# Patient Record
Sex: MALE | Race: WHITE | URBAN - METROPOLITAN AREA
[De-identification: names, ages, dates, MRNs, and addresses within clinical notes are randomized per-mention and may not be internally consistent; named-entity substitution may affect disease eponyms.]

---

## 2019-12-16 ENCOUNTER — TELEPHONE (OUTPATIENT)
Dept: INTERNAL MEDICINE CLINIC | Age: 49
End: 2019-12-16

## 2019-12-16 NOTE — TELEPHONE ENCOUNTER
Dr. Carmencita Rosales: Today   Message Contents   Mulugeta Daughters sent to [facility]  Phone Number: [phone]         Caller's first and last name and relationship to patient (if not the patient): n/a   Best contact number: [phone]   Preferred date and time: sooner than March   Scheduled appointment date and time: 3/6/20 11am   Reason for appointment: New patient establishing primary care provider. Patient stated that he has medical questions for provider, but did not include details during call.    Details to clarify the request: n/a

## 2020-03-10 ENCOUNTER — OFFICE VISIT (OUTPATIENT)
Dept: INTERNAL MEDICINE CLINIC | Age: 50
End: 2020-03-10

## 2020-03-10 VITALS
DIASTOLIC BLOOD PRESSURE: 85 MMHG | HEIGHT: 70 IN | WEIGHT: 187.4 LBS | HEART RATE: 82 BPM | SYSTOLIC BLOOD PRESSURE: 120 MMHG | BODY MASS INDEX: 26.83 KG/M2 | RESPIRATION RATE: 12 BRPM | TEMPERATURE: 98.1 F | OXYGEN SATURATION: 97 %

## 2020-03-10 DIAGNOSIS — Z00.00 WELL ADULT EXAM: Primary | ICD-10-CM

## 2020-03-10 DIAGNOSIS — F41.1 GAD (GENERALIZED ANXIETY DISORDER): ICD-10-CM

## 2020-03-10 DIAGNOSIS — E55.9 VITAMIN D DEFICIENCY: ICD-10-CM

## 2020-03-10 DIAGNOSIS — E53.8 VITAMIN B12 DEFICIENCY (NON ANEMIC): ICD-10-CM

## 2020-03-10 RX ORDER — CYCLOBENZAPRINE HCL 10 MG
10 TABLET ORAL
COMMUNITY
Start: 2020-02-13 | End: 2021-12-02

## 2020-03-10 RX ORDER — BUSPIRONE HYDROCHLORIDE 5 MG/1
5 TABLET ORAL DAILY
Qty: 30 TAB | Refills: 0 | Status: SHIPPED | OUTPATIENT
Start: 2020-03-10 | End: 2020-03-27

## 2020-03-10 NOTE — PROGRESS NOTES
Apolonia Limon is a 48 y.o. male and presents with Establish Care and Anxiety  . Patient presents as a new patient visit. His wife, Micaela Barrios, is a patient of mine. He reports he is changing PCPs primarily because he has moved in this office is closer to his home. Patient reports overall good health. He was being seen by his primary care doctor and successfully lost a significant amount of weight. Since he lost the weight he had not gone back to see his PCP. He notes that he also had not been so careful about his eating. He has gained some weight back. BMI 26.8  As noted above patient has intentionally lost weight. Several years ago he was about 200 pounds. He notes that the heaviest he has been for a year is about 200 to 204 pounds. He had lost weight but has increased back to 187 pounds from 177. He has standing desks at work. He notes that he had a bad bout of sciatica which prevented him to exercise. He is interested in getting back to his exercise regimen. LEANNA  Patient reports that a few years ago he was seen by a counselor. He notes he has some anxiety. He was diagnosed with generalized anxiety by his counselor. His LEANNA 7 score today is 11-moderate. He did not benefit greatly from the counselor because she did more therapy with him rather than cognitive therapy or tools. He is interested in developing tools to decrease his stress. He feels his symptoms are getting worse as he gets older and acknowledges the stress associated with this i.e. kids going to college. Sleeping at night 6 hours  Not really well rested when wakes up  May sometimes get panic attack at 2 am  Presentations, he has some anxiety around presentations. He sometimes has more extreme negative thoughts rather than reality. Depression  Patient reports that he had depression in the past.  Of note, his sister  at the age of 29 due to bile duct cancer. his grandparent  of the same thing.   He notes that he did have more depression last year. He feels like he is coming out of this a bit. He also acknowledges that the transition to the [address] [place] [address] had also created some stress and depression. Review of Systems  Constitutional: negative for fevers, chills, anorexia and weight loss  Eyes:   negative for visual disturbance and irritation  ENT:   negative for tinnitus,sore throat,nasal congestion,ear pains. hoarseness  Respiratory:  negative for cough, hemoptysis, dyspnea,wheezing  CV:   negative for chest pain, palpitations, lower extremity edema  GI:   negative for nausea, vomiting, diarrhea, abdominal pain,melena  Endo:               negative for polyuria,polydipsia,polyphagia,heat intolerance  Genitourinary: negative for frequency, dysuria and hematuria  Integument:  negative for rash and pruritus  Hematologic:  negative for easy bruising and gum/nose bleeding  Musculoskel: negative for myalgias, arthralgias, back pain, muscle weakness, joint pain  Neurological:  negative for headaches, dizziness, vertigo, memory problems and gait   Behavl/Psych: negative for feelings of anxiety, depression, mood changes    Past Medical History:   Diagnosis Date    Acute low back pain 2012    aggravated by sitting, inactivity    Hypertension     Pain in joint of right wrist 2012    Sciatica      History reviewed. No pertinent surgical history. Social History     Socioeconomic History    Marital status:      Spouse name: Not on file    Number of children: Not on file    Years of education: Not on file    Highest education level: Not on file   Tobacco Use    Smoking status: Never Smoker    Smokeless tobacco: Never Used   Substance and Sexual Activity    Alcohol use:  Yes     Alcohol/week: 4.2 standard drinks     Types: 5 Cans of beer per week     Comment: Occasionally    Drug use: No    Sexual activity: Yes     Partners: Female     Comment:  24 years   Social History Narrative [employer]    22 years    Manageable stress    Slightly less manageable    Work treadmill             Family History   Problem Relation Age of Onset    Cancer Mother         basal cell on leg    Breast Cancer Mother     Hypertension Father     Stroke Father     Other Father         spinal stenosis, TIA's    Cancer Sister         bile duct cancer, 30 yo passed   Cliftonparamjit Hanna Crohn's Disease Sister     Heart Disease Maternal Grandfather 45    Cancer Maternal Grandmother         bile duct cancer    Cancer Paternal Grandfather         stomach    Heart Attack Paternal Grandfather     Crohn's Disease Paternal Grandmother      Current Outpatient Medications   Medication Sig Dispense Refill    cyclobenzaprine (FLEXERIL) 10 mg tablet Take 10 mg by mouth.  ibuprofen 200 mg cap Take 200 mg by mouth as needed.  naproxen sodium (ALEVE) 220 mg cap Take 220 mg by mouth as needed.  Indications: not taking       No Known Allergies    Objective:  Visit Vitals  /85 (BP 1 Location: Left arm, BP Patient Position: Sitting)   Pulse 82   Temp 98.1 °F (36.7 °C) (Oral)   Resp 12   Ht 5' 10\" (1.778 m)   Wt 187 lb 6.4 oz (85 kg)   SpO2 97%   BMI 26.89 kg/m²     Physical Exam:   General appearance - alert, well appearing, and in no distress  Mental status - alert, oriented to person, place, and time  EYE-RALPH, EOMI, corneas normal, no foreign bodies, visual acuity normal both eyes, no periorbital cellulitis  ENT-ENT exam normal, no neck nodes or sinus tenderness  Nose - normal and patent, no erythema, discharge or polyps  Mouth - mucous membranes moist, pharynx normal without lesions  Neck - supple, no significant adenopathy   Chest - clear to auscultation, no wheezes, rales or rhonchi, symmetric air entry   Heart - normal rate, regular rhythm, normal S1, S2, no murmurs, rubs, clicks or gallops   Abdomen - soft, nontender, nondistended, no masses or organomegaly  Lymph- no adenopathy palpable  Ext-peripheral pulses normal, no pedal edema, no clubbing or cyanosis  Skin-Warm and dry. no hyperpigmentation, vitiligo, or suspicious lesions  Neuro -alert, oriented, normal speech, no focal findings or movement disorder noted  Neck-normal C-spine, no tenderness, full ROM without pain      No results found for this or any previous visit. Prevention    Cardiovascular profile  Family hx  Exercising: Patient has not been exercising due to his flare of sciatica. He is being seen by physical therapy at [employer]. Blood pressure:  Health healthy diet:  Diabetes:  Cholesterol:  Renal function:      Cancer risk profile  Patient has a significant family history for cancer. PSA he denies urinary problems  Lung  Colonoscopy will send patient for colonoscopy. He may also need endoscopy and possibly antibody testing with his family history of bile duct carcinoma  Skin nonhealing in 2 weeks patient is due to be seen by dermatology. We will send him for screening        Thyroid sx  Patient does have some anxiety LEANNA-7 score revealing moderate    Osteopenia prevention  Calcium 1000mg/day yes  Vitamin D 800iu/day yes    Mental health scale:  As above  Depression  Anxiety  Sleep # of hours:  Energy Level:        Immunizations  Patient is due for his Tdap vaccine if he has not had yet. TDAP  Pneumonia vaccine  Flu vaccine  Shingles vaccine  HPV      Diagnoses and all orders for this visit:    1. Well adult exam  Patient presents for a new patient visit. He has been working at [employer] for 22 years. He notes the stress there is significant. He does have some anxiety, moderate with LEANNA-7 score of 11. He does not have any acute issues. He does have a significant family history for cancer and cardiovascular disease. Discussed with patient. We can look at his cholesterol and if needed he may benefit from a baseline heart scan      -     PSA SCREENING (SCREENING); Future  -     METABOLIC PANEL, COMPREHENSIVE; Future  -     LIPID PANEL;  Future  - TSH 3RD GENERATION; Future  -     CBC W/O DIFF; Future  -     VITAMIN D, 25 HYDROXY; Future  -     VITAMIN B12; Future  -     REFERRAL TO GASTROENTEROLOGY  -     REFERRAL TO DERMATOLOGY    2. LEANNA (generalized anxiety disorder)  Patient's LEANNA 7 score is 11 which is consistent with moderate degree. We discussed SSRIs versus BuSpar. He is not keen on taking a medication every day so we will use BuSpar. He will try this first on the weekend. We can increase this as needed. I also discussed cognitive behavioral therapy. I also discussed potentially having him see Malachi Lopes for his low back as he may benefit from some of her therapy techniques as well. Follow-up in 2 to 3 weeks or earlier if needed  -     busPIRone (BUSPAR) 5 mg tablet; Take 1 Tab by mouth daily.  -     REFERRAL TO PSYCHIATRY    3. Vitamin B12 deficiency (non anemic)  Patient reports he does not eat a lot of red meat  Will replete as needed  -     VITAMIN B12; Future    4. Vitamin D deficiency  We will replete as needed  -     VITAMIN D, 25 HYDROXY; Future        Aside from patient's prevention new patient visit I spent an additional 25 minutes with this patient and greater than 50% of the time was spent in management and counseling with regards to his anxiety symptoms. I did a LEANNA screening which revealed moderate. I think she will benefit from some behavioral therapy and possibly BuSpar. He notes that his wife has recognized and seen that his anxiety has increased. Empathetic listening was provided as well as solutions such as these medications and therapy. He appreciates more of a holistic approach. Emphasized with him as stress increases his self-care will need to increase as well. Discussed ways where his self-care can increase which involves his family which he enjoys. Follow-up in 2 to 3 weeks or earlier if needed. Patient needs a Tdap if he has not had one yet.

## 2020-03-10 NOTE — PATIENT INSTRUCTIONS
Well Visit, Ages 25 to 48: Care Instructions Your Care Instructions Physical exams can help you stay healthy. Your doctor has checked your overall health and may have suggested ways to take good care of yourself. He or she also may have recommended tests. At home, you can help prevent illness with healthy eating, regular exercise, and other steps. Follow-up care is a key part of your treatment and safety. Be sure to make and go to all appointments, and call your doctor if you are having problems. It's also a good idea to know your test results and keep a list of the medicines you take. How can you care for yourself at home? · Reach and stay at a healthy weight. This will lower your risk for many problems, such as obesity, diabetes, heart disease, and high blood pressure. · Get at least 30 minutes of physical activity on most days of the week. Walking is a good choice. You also may want to do other activities, such as running, swimming, cycling, or playing tennis or team sports. Discuss any changes in your exercise program with your doctor. · Do not smoke or allow others to smoke around you. If you need help quitting, talk to your doctor about stop-smoking programs and medicines. These can increase your chances of quitting for good. · Talk to your doctor about whether you have any risk factors for sexually transmitted infections (STIs). Having one sex partner (who does not have STIs and does not have sex with anyone else) is a good way to avoid these infections. · Use birth control if you do not want to have children at this time. Talk with your doctor about the choices available and what might be best for you. · Protect your skin from too much sun. When you're outdoors from 10 a.m. to 4 p.m., stay in the shade or cover up with clothing and a hat with a wide brim. Wear sunglasses that block UV rays. Even when it's cloudy, put broad-spectrum sunscreen (SPF 30 or higher) on any exposed skin. · See a dentist one or two times a year for checkups and to have your teeth cleaned. · Wear a seat belt in the car. Follow your doctor's advice about when to have certain tests. These tests can spot problems early. For everyone · Cholesterol. Have the fat (cholesterol) in your blood tested after age 21. Your doctor will tell you how often to have this done based on your age, family history, or other things that can increase your risk for heart disease. · Blood pressure. Have your blood pressure checked during a routine doctor visit. Your doctor will tell you how often to check your blood pressure based on your age, your blood pressure results, and other factors. · Vision. Talk with your doctor about how often to have a glaucoma test. 
· Diabetes. Ask your doctor whether you should have tests for diabetes. · Colon cancer. Your risk for colorectal cancer gets higher as you get older. Some experts say that adults should start regular screening at age 48 and stop at age 76. Others say to start before age 48 or continue after age 76. Talk with your doctor about your risk and when to start and stop screening. For women · Breast exam and mammogram. Talk to your doctor about when you should have a clinical breast exam and a mammogram. Medical experts differ on whether and how often women under 50 should have these tests. Your doctor can help you decide what is right for you. · Cervical cancer screening test and pelvic exam. Begin with a Pap test at age 24. The test often is part of a pelvic exam. Starting at age 27, you may choose to have a Pap test, an HPV test, or both tests at the same time (called co-testing). Talk with your doctor about how often to have testing. · Tests for sexually transmitted infections (STIs). Ask whether you should have tests for STIs. You may be at risk if you have sex with more than one person, especially if your partners do not wear condoms. For men · Tests for sexually transmitted infections (STIs). Ask whether you should have tests for STIs. You may be at risk if you have sex with more than one person, especially if you do not wear a condom. · Testicular cancer exam. Ask your doctor whether you should check your testicles regularly. · Prostate exam. Talk to your doctor about whether you should have a blood test (called a PSA test) for prostate cancer. Experts differ on whether and when men should have this test. Some experts suggest it if you are older than 39 and are -American or have a father or brother who got prostate cancer when he was younger than 72. When should you call for help? Watch closely for changes in your health, and be sure to contact your doctor if you have any problems or symptoms that concern you. Where can you learn more? Go to http://fran-blanco.info/. Enter P072 in the search box to learn more about \"Well Visit, Ages 25 to 48: Care Instructions. \" Current as of: December 13, 2018 Content Version: 12.2 © 1331-2818 [employer]. Care instructions adapted under license by [employer] (which disclaims liability or warranty for this information). If you have questions about a medical condition or this instruction, always ask your healthcare professional. [address] any warranty or liability for your use of this information. Learning About Stress What is stress? Stress is what you feel when you have to handle more than you are used to. Stress is a fact of life for most people, and it affects everyone differently. What causes stress for you may not be stressful for someone else. A lot of things can cause stress. You may feel stress when you go on a job interview, take a test, or run a race. This kind of short-term stress is normal and even useful.  It can help you if you need to work hard or react quickly. For example, stress can help you finish an important job on time. Stress also can last a long time. Long-term stress is caused by stressful situations or events. Examples of long-term stress include long-term health problems, ongoing problems at work, or conflicts in your family. Long-term stress can harm your health. How does stress affect your health? When you are stressed, your body responds as though you are in danger. It makes hormones that speed up your heart, make you breathe faster, and give you a burst of energy. This is called the fight-or-flight stress response. If the stress is over quickly, your body goes back to normal and no harm is done. But if stress happens too often or lasts too long, it can have bad effects. Long-term stress can make you more likely to get sick, and it can make symptoms of some diseases worse. If you tense up when you are stressed, you may develop neck, shoulder, or low back pain. Stress is linked to high blood pressure and heart disease. Stress also harms your emotional health. It can make you rodrigez, tense, or depressed. Your relationships may suffer, and you may not do well at work or school. What can you do to manage stress? How to relax your mind · Write. It may help to write about things that are bothering you. This helps you find out how much stress you feel and what is causing it. When you know this, you can find better ways to cope. · Let your feelings out. Talk, laugh, cry, and express anger when you need to. Talking with friends, family, a counselor, or a member of the clergy about your feelings is a healthy way to relieve stress. · Do something you enjoy. For example, listen to music or go to a movie. Practice your hobby or do volunteer work. · Meditate. This can help you relax, because you are not worrying about what happened before or what may happen in the future. · Do guided imagery.  Imagine yourself in any setting that helps you feel calm. You can use audiotapes, books, or a teacher to guide you. How to relax your body · Do something active. Exercise or activity can help reduce stress. Walking is a great way to get started. Even everyday activities such as housecleaning or yard work can help. · Do breathing exercises. For example: ? From a standing position, bend forward from the waist with your knees slightly bent. Let your arms dangle close to the floor. ? Breathe in slowly and deeply as you return to a standing position. Roll up slowly and lift your head last. 
? Hold your breath for just a few seconds in the standing position. ? Breathe out slowly and bend forward from the waist. 
· Try yoga or ricky chi. These techniques combine exercise and meditation. You may need some training at first to learn them. What can you do to prevent stress? · Manage your time. This helps you find time to do the things you want and need to do. · Get enough sleep. Your body recovers from the stresses of the day while you are sleeping. · Get support. Your family, friends, and community can make a difference in how you experience stress. Where can you learn more? Go to http://franMobidia Technologyblanco.info/. Enter O339 in the search box to learn more about \"Learning About Stress. \" Current as of: April 7, 2019 Content Version: 12.2 © 7438-6245 [employer]. Care instructions adapted under license by [employer] (which disclaims liability or warranty for this information). If you have questions about a medical condition or this instruction, always ask your healthcare professional. [address] any warranty or liability for your use of this information. Learning About Guided Imagery for Stress What are guided imagery and stress? Stress is what you feel when you have to handle more than you are used to. A lot of things can cause stress.  You may feel stress when you go on a job interview, take a test, or run a race. This kind of short-term stress is normal and even useful. It can help you if you need to work hard or react quickly. Stress also can last a long time. Long-term stress is caused by stressful situations or events. Examples of long-term stress include long-term health problems, ongoing problems at work, and conflicts in your family. Long-term stress can harm your health. Guided imagery is a technique of directed thoughts and suggestions that guide your mind toward a relaxed, focused state. This technique helps you use your mind to take you to a calm, peaceful place. You can use it to relax, which can lower blood pressure and reduce other problems related to stress. How does guided imagery help to relieve stress? Because of the way the mind and body are connected, guided imagery can make you feel like you are experiencing something just by imagining it. You can achieve a relaxed state when you imagine all the details of a safe, comfortable place, such as a beach or a garden. This relaxed state may help you feel more in control of your emotions and thought processes. Feeling in control may improve your attitude, health, and sense of well-being. How do you do guided imagery? You can use a smartphone meg or a video to lead you through the process. You use all of your senses in guided imagery. For example, if you want a tropical setting, you can imagine the warm breeze on your skin, the bright blue of the water, the sound of the surf, the sweet scent of tropical flowers, and the taste of coconut. Imagining those things can make you actually feel like you're there. But you don't have to imagine the tropics to feel peace. Guided imagery can take you to your own restful place. To give guided imagery a try, follow these steps: 
· Lean back comfortably in your chair. If you can, close your eyes. Put your arms on the armrests, or fold your hands in your lap. · Take a deep breath through your nose. Breathe in slowly, and then let the air out completely through your mouth. · Do it again slowly. Keep breathing like this. Gather up any tension in your body, and send it out with every breath. · Let a feeling of warmth spread from your lungs to your neck and head, down your arms to your fingertips, through your body and into your legs, all the way down to your toes. Stay this way for a moment. · Now imagine a pleasant day. You're at a park or at a place you've visited in the past where you felt at peace. · In your mind's eye, look at what lies in front of you. Maybe you see the sun, filtered through trees. Maybe clouds are drifting by. · Look to one side, and then the other. Notice the feel of the air around you. Notice how it feels on your face and on your arms. · Stay here for a while. Let it become real for you. Follow-up care is a key part of your treatment and safety. Be sure to make and go to all appointments, and call your doctor if you are having problems. It's also a good idea to know your test results and keep a list of the medicines you take. Where can you learn more? Go to http://fran-blanco.info/. Enter N291 in the search box to learn more about \"Learning About Guided Imagery for Stress. \" Current as of: April 7, 2019 Content Version: 12.2 © 6665-9566 [employer]. Care instructions adapted under license by [employer] (which disclaims liability or warranty for this information). If you have questions about a medical condition or this instruction, always ask your healthcare professional. [place] [address] any warranty or liability for your use of this information. Learning About Mindfulness for Stress What are mindfulness and stress? Stress is what you feel when you have to handle more than you are used to. A lot of things can cause stress.  You may feel stress when you go on a job interview, take a test, or run a race. This kind of short-term stress is normal and even useful. It can help you if you need to work hard or react quickly. Stress also can last a long time. Long-term stress is caused by stressful situations or events. Examples of long-term stress include long-term health problems, ongoing problems at work, and conflicts in your family. Long-term stress can harm your health. Mindfulness is a focus only on things happening in the present moment. It's a process of purposefully paying attention to and being aware of your surroundings, your emotions, your thoughts, and how your body feels. You are aware of these things, but you aren't judging these experiences as \"good\" or \"bad. \" Mindfulness can help you learn to calm your mind and body to help you cope with illness, pain, and stress. How does mindfulness help to relieve stress? Mindfulness can help quiet your mind and relax your body. Studies show that it can help some people sleep better, feel less anxious, and bring their blood pressure down. And it's been shown to help some people live and cope better with certain health problems like heart disease, depression, chronic pain, and cancer. How do you practice mindfulness? To be mindful is to pay attention, to be present, and to be accepting. · When you're mindful, you do just one thing and you pay close attention to that one thing. For example, you may sit quietly and notice your emotions or how your food tastes and smells. · When you're present, you focus on the things that are happening right now. You let go of your thoughts about the past and the future. When you dwell on the past or the future, you miss moments that can heal and strengthen you. You may miss moments like hearing a child laugh or seeing a friendly face when you think you're all alone. · When you're accepting, you don't  the present moment. Instead you accept your thoughts and feelings as they come. You can practice anytime, anywhere, and in any way you choose. You can practice in many ways. Here are a few ideas: · While doing your chores, like washing the dishes, let your mind focus on what's in your hand. What does the dish feel like? Is the water warm or cold? · Go outside and take a few deep breaths. What is the air like? Is it warm or cold? · When you can, take some time at the start of your day to sit alone and think. · Take a slow walk by yourself. Count your steps while you breathe in and out. · Try yoga breathing exercises, stretches, and poses to strengthen and relax your muscles. · At work, if you can, try to stop for a few moments each hour. Note how your body feels. Let yourself regroup and let your mind settle before you return to what you were doing. · If you struggle with anxiety or \"worry thoughts,\" imagine your mind as a blue jennifer and your worry thoughts as clouds. Now imagine those worry thoughts floating across your mind's jennifer. Just let them pass by as you watch. Follow-up care is a key part of your treatment and safety. Be sure to make and go to all appointments, and call your doctor if you are having problems. It's also a good idea to know your test results and keep a list of the medicines you take. Where can you learn more? Go to http://fran-blanco.info/. Enter R755 in the search box to learn more about \"Learning About Mindfulness for Stress. \" Current as of: April 7, 2019 Content Version: 12.2 © 0111-3538 [employer]. Care instructions adapted under license by [employer] (which disclaims liability or warranty for this information). If you have questions about a medical condition or this instruction, always ask your healthcare professional. [address] any warranty or liability for your use of this information.

## 2020-03-12 LAB
25(OH)D3+25(OH)D2 SERPL-MCNC: 12.7 NG/ML (ref 30–100)
ALBUMIN SERPL-MCNC: 4.7 G/DL (ref 4–5)
ALBUMIN/GLOB SERPL: 2.1 {RATIO} (ref 1.2–2.2)
ALP SERPL-CCNC: 41 IU/L (ref 39–117)
ALT SERPL-CCNC: 35 IU/L (ref 0–44)
AST SERPL-CCNC: 30 IU/L (ref 0–40)
BILIRUB SERPL-MCNC: 0.6 MG/DL (ref 0–1.2)
BUN SERPL-MCNC: 11 MG/DL (ref 6–24)
BUN/CREAT SERPL: 12 (ref 9–20)
CALCIUM SERPL-MCNC: 9.5 MG/DL (ref 8.7–10.2)
CHLORIDE SERPL-SCNC: 99 MMOL/L (ref 96–106)
CHOLEST SERPL-MCNC: 203 MG/DL (ref 100–199)
CO2 SERPL-SCNC: 25 MMOL/L (ref 20–29)
CREAT SERPL-MCNC: 0.92 MG/DL (ref 0.76–1.27)
ERYTHROCYTE [DISTWIDTH] IN BLOOD BY AUTOMATED COUNT: 11.7 % (ref 11.6–15.4)
GLOBULIN SER CALC-MCNC: 2.2 G/DL (ref 1.5–4.5)
GLUCOSE SERPL-MCNC: 102 MG/DL (ref 65–99)
HCT VFR BLD AUTO: 45.8 % (ref 37.5–51)
HDLC SERPL-MCNC: 57 MG/DL
HGB BLD-MCNC: 15.6 G/DL (ref 13–17.7)
INTERPRETATION, 910389: NORMAL
LDLC SERPL CALC-MCNC: 125 MG/DL (ref 0–99)
MCH RBC QN AUTO: 30.7 PG (ref 26.6–33)
MCHC RBC AUTO-ENTMCNC: 34.1 G/DL (ref 31.5–35.7)
MCV RBC AUTO: 90 FL (ref 79–97)
PLATELET # BLD AUTO: 310 X10E3/UL (ref 150–450)
POTASSIUM SERPL-SCNC: 4.4 MMOL/L (ref 3.5–5.2)
PROT SERPL-MCNC: 6.9 G/DL (ref 6–8.5)
PSA SERPL-MCNC: 0.5 NG/ML (ref 0–4)
RBC # BLD AUTO: 5.08 X10E6/UL (ref 4.14–5.8)
SODIUM SERPL-SCNC: 140 MMOL/L (ref 134–144)
TRIGL SERPL-MCNC: 107 MG/DL (ref 0–149)
TSH SERPL DL<=0.005 MIU/L-ACNC: 1.88 UIU/ML (ref 0.45–4.5)
VIT B12 SERPL-MCNC: 394 PG/ML (ref 232–1245)
VLDLC SERPL CALC-MCNC: 21 MG/DL (ref 5–40)
WBC # BLD AUTO: 5.4 X10E3/UL (ref 3.4–10.8)

## 2020-03-14 RX ORDER — ASPIRIN 325 MG
50000 TABLET, DELAYED RELEASE (ENTERIC COATED) ORAL
Qty: 12 CAP | Refills: 0 | Status: SHIPPED | OUTPATIENT
Start: 2020-03-14 | End: 2020-06-15

## 2020-05-08 ENCOUNTER — VIRTUAL VISIT (OUTPATIENT)
Dept: INTERNAL MEDICINE CLINIC | Age: 50
End: 2020-05-08

## 2020-05-08 VITALS — BODY MASS INDEX: 26.77 KG/M2 | HEIGHT: 70 IN | WEIGHT: 187 LBS

## 2020-05-08 DIAGNOSIS — F34.1 DYSTHYMIA: ICD-10-CM

## 2020-05-08 DIAGNOSIS — F41.9 ANXIETY: Primary | ICD-10-CM

## 2020-05-08 RX ORDER — BUSPIRONE HYDROCHLORIDE 5 MG/1
TABLET ORAL
Qty: 120 TAB | Refills: 2 | Status: SHIPPED | OUTPATIENT
Start: 2020-05-08 | End: 2020-08-22

## 2020-05-08 NOTE — PROGRESS NOTES
Consent: Dany Lara, who was seen by synchronous (real-time) audio-video technology, and/or his healthcare decision maker, is aware that this patient-initiated, Telehealth encounter on 5/8/2020 is a billable service, with coverage as determined by his insurance carrier. He is aware that he may receive a bill and has provided verbal consent to proceed: YES      712  Assessment & Plan:   Diagnoses and all orders for this visit:    1. Anxiety  Not optimally controlled  We will increase his BuSpar to 5 mg in the morning and at noon and 2 in the evening. We also discussed having him learn behavioral tools to decrease his symptoms as well. I will refer him to Apolinar Moya for this. I discussed with him breathing, guided imagery and muscle relaxation techniques  I would like to see how he was doing in about 3 to 4 weeks. I discussed with him sertraline which I think he may benefit as well. If he is not getting adequate relief we can put him on a low-dose sertraline and then augment him with BuSpar in the day as needed  -     REFERRAL TO PSYCHIATRY    2. Dysthymia  Not optimally controlled  Encouraged him to continue exercise and heart healthy eating and yoga  Again I think sertraline may be more beneficial.  He would like to stay with the BuSpar for now. I spent 25 minutes with this patient greater than 50% of time was spent in management and counseling with regards to his anxiety which is not optimally controlled and an underlying dysthymia. At his next visit we will do another LEANNA 7 and PHQ 2 score. /PHQ 9. Subjective:   Dany Lara is a 48 y.o. male who was seen for Medication Evaluation    Patient his been working at home as he is with [employer]. He reports he is transitioned okay. He is staying active and has been running on his treadmill and lifting weights with dumbbells and doing some yoga. Anxiety  Patient was taking BuSpar as needed then started to take daily.   He is taking every day and feels that it initially helped him for the first month but then has decreased. He does not have any side effects. He is not sure if it is really helping him. He is taking 5 mg p.o. daily. He is sleeping about 6 to 7 hours and about half the time he feels well rested. He notes an energy level of 5-8 over 10. He describes an increased sensitivity to hearing noises. He is at his desk and hears children outside or lines being cut. Cannot seem to cut that off in his brain. He is now using earplugs. His weight is stable. He is not having any butterflies in his stomach. He used to wake up at 3 to 4:00 in the morning with somewhat of a panic attack but he has not had that. Background  Patient reports that a few years ago he was seen by a counselor. He notes he has some anxiety. He was diagnosed with generalized anxiety by his counselor. His LEANNA 7 score today is 11-moderate. He did not benefit greatly from the counselor because she did more therapy with him rather than cognitive therapy or tools. He is interested in developing tools to decrease his stress. He feels his symptoms are getting worse as he gets older and acknowledges the stress associated with this i.e. kids going to college. Sleeping at night 6 hours  Not really well rested when wakes up    BMI 26.8  Is been able to maintain his weight with a good diet and activity with his treadmill  As noted above patient has intentionally lost weight. Several years ago he was about 200 pounds. He notes that the heaviest he has been for a year is about 200 to 204 pounds. He had lost weight but has increased back to 187 pounds from 177. He has standing desks at work. He notes that he had a bad bout of sciatica which prevented him to exercise. He is interested in getting back to his exercise regimen. Depression  He reports more of decreased energy. He is not sure if he has depression.   Patient reports that he had depression in the past.  Of note, his sister  at the age of 29 due to bile duct cancer. his grandparent  of the same thing. He notes that he did have more depression last year. He feels like he is coming out of this a bit. He also acknowledges that the transition to the [address] [place] [address] had also created some stress and depression. Current Outpatient Medications   Medication Sig    cyanocobalamin, vitamin B-12, (B-12 DOTS PO) Take  by mouth.  busPIRone (BUSPAR) 5 mg tablet TAKE 1 TABLET BY MOUTH EVERY DAY    cholecalciferol (VITAMIN D3) (50,000 UNITS /1250 MCG) capsule Take 1 Cap by mouth every seven (7) days.  cyclobenzaprine (FLEXERIL) 10 mg tablet Take 10 mg by mouth.  ibuprofen 200 mg cap Take 200 mg by mouth as needed.  naproxen sodium (ALEVE) 220 mg cap Take 220 mg by mouth as needed. Indications: not taking     No current facility-administered medications for this visit.         No Known Allergies  Subjective    Past Medical History:   Diagnosis Date    Acute low back pain     aggravated by sitting, inactivity    Hypertension     Pain in joint of right wrist     Sciatica        ROS  All other systems reviewed and negative, unless mentioned in HPI    Objective:   Vital Signs: (As obtained by patient/caregiver at home)  Visit Vitals  Ht 5' 10\" (1.778 m)   Wt 187 lb (84.8 kg)   BMI 26.83 kg/m²        [INSTRUCTIONS:  \"[x]\" Indicates a positive item  \"[]\" Indicates a negative item  -- DELETE ALL ITEMS NOT EXAMINED]    Constitutional: [x] Appears well-developed and well-nourished [x] No apparent distress      [] Abnormal -     Mental status: [x] Alert and awake  [x] Oriented to person/place/time [x] Able to follow commands    [] Abnormal -     Eyes:   EOM    [x]  Normal    [] Abnormal -   Sclera  [x]  Normal    [] Abnormal -          Discharge [x]  None visible   [] Abnormal -     HENT: [x] Normocephalic, atraumatic  [] Abnormal -   [x] Mouth/Throat: Mucous membranes are moist    External Ears [x] Normal  [] Abnormal -    Neck: [x] No visualized mass [] Abnormal -     Pulmonary/Chest: [x] Respiratory effort normal   [x] No visualized signs of difficulty breathing or respiratory distress        [] Abnormal -      Musculoskeletal:   [x] Normal gait with no signs of ataxia         [x] Normal range of motion of neck        [] Abnormal -     Neurological:        [x] No Facial Asymmetry (Cranial nerve 7 motor function) (limited exam due to video visit)          [x] No gaze palsy        [] Abnormal -          Skin:        [x] No significant exanthematous lesions or discoloration noted on facial skin         [] Abnormal -            Psychiatric:       [x] Normal Affect [] Abnormal -        [x] No Hallucinations    Other pertinent observable physical exam findings:-      We discussed the expected course, resolution and complications of the diagnosis(es) in detail. Medication risks, benefits, costs, interactions, and alternatives were discussed as indicated. I advised him to contact the office if his condition worsens, changes or fails to improve as anticipated. He expressed understanding with the diagnosis(es) and plan. Kiel Jaimes is a 48 y.o. male being evaluated by a video visit encounter for concerns as above. A caregiver was present when appropriate. Due to this being a TeleHealth encounter (During YZV58 public [facility] emergency), evaluation of the following organ systems was limited: Vitals/Constitutional/EENT/Resp/CV/GI//MS/Neuro/Skin/Heme-Lymph-Imm. Pursuant to the emergency declaration under the [facility], [facility] waiver authority and the [employer] Act, this Virtual  Visit was conducted, with patient's (and/or legal guardian's) consent, to reduce the patient's risk of exposure to COVID-19 and provide necessary medical care.      Services were provided through a video synchronous discussion virtually to substitute for in-person clinic visit. Patient and provider were located at their individual homes.     Best Trejo MD

## 2020-05-08 NOTE — Clinical Note
Renny Julien, can you have him follow-up in another 3 to 4 weeks. Willy Diaz can you please reach out to patient and teach him some breathing techniques and other cognitive tools to decrease anxiety.

## 2020-05-29 ENCOUNTER — VIRTUAL VISIT (OUTPATIENT)
Dept: INTERNAL MEDICINE CLINIC | Age: 50
End: 2020-05-29

## 2020-05-29 ENCOUNTER — DOCUMENTATION ONLY (OUTPATIENT)
Dept: INTERNAL MEDICINE CLINIC | Age: 50
End: 2020-05-29

## 2020-05-29 DIAGNOSIS — F41.1 GAD (GENERALIZED ANXIETY DISORDER): Primary | ICD-10-CM

## 2020-05-29 NOTE — PROGRESS NOTES
Barbie North,    It was good to meet with you today! Radha attached the info we talked about:    - The breathing exercise and video  - A CBT worksheet  - A list of cognitive distortions  - A handout on mindfulness exercises    And my slide on the anxiety formula: In the case of people with anxiety, they tend to OVERESTIMATE the danger and UNDERESTIMATE their ability to deal with it! Let me know if you have any questions. See you in 2 weeks!     Alvin Hilton

## 2020-06-02 NOTE — PROGRESS NOTES
Assessment    Name:  Merline Linsey                   :    1970   Date:    2020  Type of visit:  Virtual, with pt at home and [facility] in home office. PRESENTING PROBLEM:     Pt was referred for help with anxiety, some depression, and panic attacks. He has his most recent panic attack yesterday. He reports that he is unable to stop worrying, feels anxious all the time, says his worry prevents him from being \"present\" with his family and enjoying things. PRECIPITANTS:    Pt reports a long history of anxiety that he has always just dealt with. He denies that COVID and the resulting changes have increased his anxiety. He denies that working from home is stressful, although complained to dr that he became very sensitive to sound and to kids playing outside, started wearing ear plugs. He just thinks his anxiety is getting worse as he gets older, says the [place]" are higher - 3 teens in high school, more responsibility at work. Suicidal Ideation:  denies  Homicidal Ideation:  denies  Self-Harm: denies  Substance Use:  Alcohol pt said that he is not a big drinker, may have a few beers once in a while. TRAUMA HISTORY:   Traumatic Event his sister  at 33yo from bile duct cancer, and pt became depressed afterwards. PSYCH HISTORY:  Outpatient  pt saw a counselor a few years ago but did not find it helpful for his anxiety, was more psychotherapy. He remembers always feeling anxious, said it led him to stop playing baseball in high school because he didn't want to have to deal with getting up at bat. Family history: Pt said his father and GF have anxiety. SOCIAL HISTORY:  Pt grew up in [place] with both parents and his sister. Pt was anxious but did well in school, attended the [employer] and served in [employer] for 8 years. Said he did well in [employer] and was not as anxious, because he was always busy and it was very structured so he knew what was expected of him.   He has been  for over 18 years, and has a 14yo, 15yo, and 15yo. 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 Pt likes to run and has been trying yoga for relaxation. He was living in [facility] [place] but 2 years ago moved to [facility] to a bigger house. Pt said his family is happy about the move but he is not, he misses his old house. MEDICAL ISSUES:    None reported. CURRENT MEDS:    Prior to Admission medications    Medication Sig Start Date End Date Taking? Authorizing Provider   cyanocobalamin, vitamin B-12, (B-12 DOTS PO) Take  by mouth. Provider, Historical   busPIRone (BUSPAR) 5 mg tablet Take 1 tablet in the morning and at noon. Take 2 tablets p.m. 2 hours prior to sleep 5/8/20   Anahi Morin MD   cholecalciferol (VITAMIN D3) (50,000 UNITS /1250 MCG) capsule Take 1 Cap by mouth every seven (7) days. 3/14/20   Anahi Morin MD   cyclobenzaprine (FLEXERIL) 10 mg tablet Take 10 mg by mouth. 2/13/20   Provider, Historical   ibuprofen 200 mg cap Take 200 mg by mouth as needed. Provider, Historical   naproxen sodium (ALEVE) 220 mg cap Take 220 mg by mouth as needed.  Indications: not taking    Provider, Historical        MINI-MENTAL STATUS EXAM:    Orientation  person, place, time/date and situation   Behavior  cooperative   Eye Contact  appropriate   Appearance:   age appropriate and casually dressed   Motor Behavior:   within normal limits   Speech:   normal rate and volume   Thought Process:  within normal limits   Thought Content  preoccupations   Mood:   anxious   Affect:   anxious   Memory recent   adequate   Memory remote:   adequate   Concentration:   adequate   Insight:   fair   Motivation:  fair   Judgment:   fair     STRENGTHS:  intelligent, employed, financially stable                            LIMITATIONS:  anxiety, family confict                         SCREENINGS: PHQ9:  PHQ 9 Score: 7 = mild depression                                   LEANNA 7:  BSHSI AMB LEANNA-7 SCORE: 16 = severe anxiety                      ASSESSMENT AND RECOMMENDATIONS:  Pt presents as pleasant but preoccupied with his anxiety. He states that he feels like an \"intruder\" in his own house as he has been either away at work or so caught up in his anxiety that he could not enjoy time with his family. In addition to worrying about \"what ifs\" pt admits to attaching emotion to objects such as his house. He admits that he tends to hold on to things, but denies that he is a hoarder. His last panic attack was triggered by making a decision to refinance his current house, due to his attachment to his old house. Pt wants to learn how to manage his anxiety better. [employer] [facility] educated pt on CBT/ACT in addition to relaxation methods. Will send pt info on CBT and ACT and breathing exercise, and mindfulness exercises as pt could use help being in the here and now. Shared with pt the anxiety formula of overestimating the danger of anxiety and underestimating his own resources to deal with it, and pt really related to this. Will return next week. DIAGNOSIS:  Axis I: Generalized Anxiety Disorder  Axis II: Deferred  Axis III:   Past Medical History:   Diagnosis Date    Acute low back pain 2012    aggravated by sitting, inactivity    Hypertension     Pain in joint of right wrist 2012    Sciatica      Axis IV: Problems with primary support group and Problems related to social environment  Axis V:  51-60 moderate symptoms    PLAN:    Follow-up and Dispositions    · Return in about 2 weeks (around 6/12/2020) for Follow Up. This patient was referred to the [address] [place] [address] by Dr Kim Nair for help with management of anxiety.  Met with pt. for initial session of 60 minutes to establish contact, to assess symptoms and mental status, identify health behavior goals, and develop plan of care based on readiness to change. [facility] will coordinate with PCP for plan of care.     GOALS:  Kimmy Nunez will report increase in engagement with his family  Kimmy Nunez will report decrease in worrying thoughts  Kimmy Nunez will practice daily self-care activities  Kimmy Nunez will identify coping skills to deal with stress and anxiety      INTERVENTIONS:  Provided supportive therapy and encouragement  Introduced CBT/ACT concepts  Reviewed coping strategies  Provided psychoeducation on anxiety  IIntroduced cognitive distortion concepts  Introduced mindfulness interventions  Introduced anxiety management techniques  Reviewed lifestyle modifications including management of stressors, physical activity, pleasurable activities/self-care, social support, positive life goals, medication     Homework given: cognitive distortions, reading assignment of CBT and ACT, breathing exercise, mindfulness exercises      Erich Grier LCSW

## 2020-06-12 ENCOUNTER — DOCUMENTATION ONLY (OUTPATIENT)
Dept: INTERNAL MEDICINE CLINIC | Age: 50
End: 2020-06-12

## 2020-06-12 ENCOUNTER — VIRTUAL VISIT (OUTPATIENT)
Dept: INTERNAL MEDICINE CLINIC | Age: 50
End: 2020-06-12

## 2020-06-12 DIAGNOSIS — F41.1 GAD (GENERALIZED ANXIETY DISORDER): Primary | ICD-10-CM

## 2020-06-12 NOTE — PROGRESS NOTES
Behavioral Health Progress Note    Date :  06/12/20   Name: Noa Heller   Session Length: 50  Type of visit:  Virtual, with pt at home and [facility] in home office    MENTAL STATUS:  Leigh Ann Nieves presents as pleasant, is doing ok but still worrying about everything. Describes it as feeling \"overwhelming pressure\" to keep things up. RISK ASSESSMENT:   Patient denies Suicidal Ideation/Homicidal Ideation/Self-Injury Behavior. ASSESSMENT AND INTERVENTION:    Pt said he read over everything [facility] sent, and really related to the list of cognitive distortions and liked the breathing exercise. He identified that he uses Magnification, Catastophizing, and All-or-nothing thinking. He keeps saying how \"the stakes are so high,\" that he can't make a mistake. This creates a lot of pressure and his feeling that he's stuck and there's nothing he can do. Discussed medication as an option, as a way to help him get unstuck as he feels his thoughts are almost uncontrollable. Pt is now willing to consider an anti-depressant, he will discuss with  at next appointment. He sees this as going down a 1-way road, and [facility] challenged this all-or-nothing thinking and pt acknowledged this. Pt asked about ACT and how to deal with worries, [facility] explained technique of \"exaggerating the worry,\" pt would like to try. He has been doing the breathing exercise and finds it very helpful. He hasn't started yoga yet, his cited his obstacle as setting aside the time to do it. [facility] suggested inviting his wife to do it with him, as that would increase the chance that he would do it and also work towards his goal of interacting and enjoying more time with family. PLAN:    Pt will return in 2 weeks. [facility] will email him info on exaggerating the worry, a handout on how the brain responds to anxiety with his \"freeze\" response, and a 3 minute moving meditation.

## 2020-06-13 NOTE — PROGRESS NOTES
Mario Joseph Reading attached the info we talked about:  - On exaggerating the worry  - How the brain responds to anxiety    Here is the link to the 3 minute moving meditation:  [email or web address]    Have a nice weekend!     Alvin Hilton

## 2020-06-15 RX ORDER — ASPIRIN 325 MG
TABLET, DELAYED RELEASE (ENTERIC COATED) ORAL
Qty: 12 CAP | Refills: 0 | Status: SHIPPED | OUTPATIENT
Start: 2020-06-15 | End: 2020-09-13

## 2020-06-25 ENCOUNTER — DOCUMENTATION ONLY (OUTPATIENT)
Dept: INTERNAL MEDICINE CLINIC | Age: 50
End: 2020-06-25

## 2020-06-25 NOTE — PROGRESS NOTES
Damaris Collazo,    I understand, work will do that [id number]    Emailing me to let me know is fine, or you could always call the main number [phone] and let them know. For rescheduling, I have 2 times available next week: Wednesday 7/1 at 2pm  Thursday 7/2 at9am    The following week:  Tuesday 7/7 at 10am, 2pm, 3pm  Wednesday 7/8 at 10am, 1pm, 2pm, 3pm    Let me know as soon as you can so I can put you in that slot. Thanks,      Renetta Quintero, Licensed Clinical   Certified Employee Assistance Counselor  Clinical Lead for [address] [place] [address]  [phone]      Good Help To Those In Need ®        From: [email or web address]   Sent: Thursday, June 25, 2020 5:41 PM  To: [email or web address]  Subject: #ExtMail# Re:    **WARNING: This email originated from outside of the [employer] system. **  DO NOT CLICK links or open attachments unless you recognize the sender and know the content is safe. ** NEVER provide your User ID or Password. **    Mario Henson, thanks so much for this information, I am getting a lot out of it and our discussions. We have time scheduled for tomorrow at 10am, but unfortunately something just came up at work for that same time and now I wont be able to meet. Whats the procedure for rescheduling? Apologies for the late notice, I hope this doesnt cause any inconvenience.     Marylen Campi

## 2020-09-23 RX ORDER — BUSPIRONE HYDROCHLORIDE 5 MG/1
TABLET ORAL
Qty: 30 TAB | Refills: 0 | Status: SHIPPED | OUTPATIENT
Start: 2020-09-23 | End: 2020-10-15

## 2020-12-07 ENCOUNTER — OFFICE VISIT (OUTPATIENT)
Dept: INTERNAL MEDICINE CLINIC | Age: 50
End: 2020-12-07
Payer: COMMERCIAL

## 2020-12-07 VITALS
HEIGHT: 70 IN | BODY MASS INDEX: 26.71 KG/M2 | OXYGEN SATURATION: 97 % | RESPIRATION RATE: 16 BRPM | TEMPERATURE: 97.7 F | HEART RATE: 80 BPM | WEIGHT: 186.6 LBS | DIASTOLIC BLOOD PRESSURE: 84 MMHG | SYSTOLIC BLOOD PRESSURE: 124 MMHG

## 2020-12-07 DIAGNOSIS — E55.9 VITAMIN D DEFICIENCY: Primary | ICD-10-CM

## 2020-12-07 DIAGNOSIS — F41.9 ANXIETY: ICD-10-CM

## 2020-12-07 PROCEDURE — 99214 OFFICE O/P EST MOD 30 MIN: CPT | Performed by: INTERNAL MEDICINE

## 2020-12-07 RX ORDER — SERTRALINE HYDROCHLORIDE 50 MG/1
50 TABLET, FILM COATED ORAL DAILY
Qty: 30 TAB | Refills: 1 | Status: SHIPPED | OUTPATIENT
Start: 2020-12-07 | End: 2021-02-02

## 2020-12-07 NOTE — PROGRESS NOTES
Diagnoses and all orders for this visit:    1. Vitamin D deficiency  We will check to ensure patient's vitamin D is within range  -     sertraline (ZOLOFT) 50 mg tablet; Take 1 Tab by mouth daily. -     VITAMIN D, 25 HYDROXY; Future    2. Anxiety  Not optimally controlled on BuSpar  We will transition him over to sertraline 50 mg. He will take 25 mg for 2 to 3 days and then increase to 50 mg.  I also encouraged him to find a counselor through his EAP program or through his insurance program.  I think he would greatly benefit from cognitive behavioral therapy. We will see if we can eventually wean him off his SSRI. -     VITAMIN D, 25 HYDROXY; Future             I spent 25 min with this patient and >50% of the time was spent on counseling and management of anxiety not optimally controlled. We discussed his LEANNA-7 score, we discussed counseling and we discussed medication which I think she would benefit from a more long-acting medication. I think he would greatly benefit from CBT and over time wean him off his medication if needed. Chief Complaint   Patient presents with    Follow-up    Medication Evaluation     anxity medication      Anxiety  Patient reports that he has been taking BuSpar in the evening and once in the morning. He reports the morning dose sometimes makes his stomach get irritated. He notes he has been having a difficult time keeping things in perspective. He sometimes develops a catastrophic type thinking. He notes he has a lot of stress at work. He also notes that he will start laminating problems 1 on top of the other. His LEANNA-7 score is 18. Past Medical History:   Diagnosis Date    Acute low back pain 2012    aggravated by sitting, inactivity    Hypertension     Pain in joint of right wrist 2012    Sciatica      History reviewed. No pertinent surgical history.   Social History     Socioeconomic History    Marital status:      Spouse name: Not on file    Number of children: Not on file    Years of education: Not on file    Highest education level: Not on file   Tobacco Use    Smoking status: Never Smoker    Smokeless tobacco: Never Used   Substance and Sexual Activity    Alcohol use: Yes     Alcohol/week: 4.2 standard drinks     Types: 5 Cans of beer per week     Comment: Occasionally    Drug use: No    Sexual activity: Yes     Partners: Female     Comment:  24 years   Social History Narrative    [employer]    22 years    Manageable stress    Slightly less manageable    Work treadmill             Family History   Problem Relation Age of Onset    Cancer Mother         basal cell on leg    Breast Cancer Mother     Hypertension Father     Stroke Father     Other Father         spinal stenosis, TIA's    Cancer Sister         bile duct cancer, 28 yo passed    Crohn's Disease Sister     Heart Disease Maternal Grandfather 45    Cancer Maternal Grandmother         bile duct cancer    Cancer Paternal Grandfather         stomach    Heart Attack Paternal Grandfather     Crohn's Disease Paternal Grandmother      Current Outpatient Medications   Medication Sig Dispense Refill    cholecalciferol (VITAMIN D3) (50,000 UNITS /1250 MCG) capsule TAKE 1 CAPSULE BY MOUTH EVERY SEVEN (7) DAYS. TRANSITION TO 1000 IU DAILY AND FOLLOW UP APPT 4 Cap 0    busPIRone (BUSPAR) 5 mg tablet TAKE 1 TABLET BY MOUTH IN THE MORNING AND AT NOON. TAKE 2 TABLETS AT NIGHT 2 HOURS PRIOR SLEEP 270 Tab 0    cyanocobalamin, vitamin B-12, (B-12 DOTS PO) Take  by mouth.  cyclobenzaprine (FLEXERIL) 10 mg tablet Take 10 mg by mouth.  ibuprofen 200 mg cap Take 200 mg by mouth as needed.  naproxen sodium (ALEVE) 220 mg cap Take 220 mg by mouth as needed.  Indications: not taking       No Known Allergies    Review of Systems - General ROS: negative for - chills or fever  Cardiovascular ROS: no chest pain or dyspnea on exertion  Respiratory ROS: no cough, shortness of breath, or wheezing    Visit Vitals  /84 (BP 1 Location: Left arm, BP Patient Position: Sitting)   Pulse 80   Temp 97.7 °F (36.5 °C) (Oral)   Resp 16   Ht 5' 10\" (1.778 m)   Wt 186 lb 9.6 oz (84.6 kg)   SpO2 97%   BMI 26.77 kg/m²           Constitutional: [x] Appears well-developed and well-nourished [x] No apparent distress      [] Abnormal -     Mental status: [x] Alert and awake  [x] Oriented to person/place/time [x] Able to follow commands    [] Abnormal -     Eyes:   EOM    [x]  Normal    [] Abnormal -   Sclera  [x]  Normal    [] Abnormal -          Discharge [x]  None visible   [] Abnormal -     HENT: [x] Normocephalic, atraumatic  [] Abnormal -   [x] Mouth/Throat: Mucous membranes are moist    External Ears [x] Normal  [] Abnormal -    Neck: [x] No visualized mass [] Abnormal -     Pulmonary/Chest: [x] Respiratory effort normal   [x] No visualized signs of difficulty breathing or respiratory distress        [] Abnormal -      Musculoskeletal:   [x] Normal gait with no signs of ataxia         [x] Normal range of motion of neck        [] Abnormal -     Neurological:        [x] No Facial Asymmetry (Cranial nerve 7 motor function) (limited exam due to video visit)          [x] No gaze palsy        [] Abnormal -          Skin:        [x] No significant exanthematous lesions or discoloration noted on facial skin         [] Abnormal -            Psychiatric:       [x] Normal Affect [] Abnormal -        [x] No Hallucinations            ATTENTION:   This medical record was transcribed using an electronic medical records/speech recognition system. Although proofread, it may and can contain electronic, spelling and other errors. Corrections may be executed at a later time. Please feel free to contact us for any clarifications as needed.

## 2021-02-01 RX ORDER — BUSPIRONE HYDROCHLORIDE 5 MG/1
TABLET ORAL
Qty: 270 TAB | Refills: 0 | Status: SHIPPED | OUTPATIENT
Start: 2021-02-01 | End: 2021-12-02

## 2021-02-02 DIAGNOSIS — E55.9 VITAMIN D DEFICIENCY: Primary | ICD-10-CM

## 2021-02-02 DIAGNOSIS — Z82.49 FAMILY HISTORY OF HEART DISEASE: ICD-10-CM

## 2021-05-03 DIAGNOSIS — E55.9 VITAMIN D DEFICIENCY: ICD-10-CM

## 2021-05-03 RX ORDER — SERTRALINE HYDROCHLORIDE 50 MG/1
TABLET, FILM COATED ORAL
Qty: 90 TAB | Refills: 0 | Status: SHIPPED | OUTPATIENT
Start: 2021-05-03 | End: 2021-07-29

## 2021-07-29 DIAGNOSIS — E55.9 VITAMIN D DEFICIENCY: ICD-10-CM

## 2021-07-29 RX ORDER — SERTRALINE HYDROCHLORIDE 50 MG/1
TABLET, FILM COATED ORAL
Qty: 90 TABLET | Refills: 0 | Status: SHIPPED | OUTPATIENT
Start: 2021-07-29 | End: 2021-11-03

## 2021-11-04 DIAGNOSIS — E55.9 VITAMIN D DEFICIENCY: ICD-10-CM

## 2021-11-05 RX ORDER — SERTRALINE HYDROCHLORIDE 50 MG/1
50 TABLET, FILM COATED ORAL DAILY
Qty: 30 TABLET | Refills: 0 | Status: SHIPPED | OUTPATIENT
Start: 2021-11-05 | End: 2022-01-05

## 2021-12-02 ENCOUNTER — OFFICE VISIT (OUTPATIENT)
Dept: INTERNAL MEDICINE CLINIC | Age: 51
End: 2021-12-02
Payer: COMMERCIAL

## 2021-12-02 VITALS
DIASTOLIC BLOOD PRESSURE: 90 MMHG | RESPIRATION RATE: 14 BRPM | TEMPERATURE: 97.8 F | HEIGHT: 70 IN | SYSTOLIC BLOOD PRESSURE: 130 MMHG | HEART RATE: 59 BPM | WEIGHT: 197 LBS | OXYGEN SATURATION: 100 % | BODY MASS INDEX: 28.2 KG/M2

## 2021-12-02 DIAGNOSIS — R03.0 BORDERLINE HYPERTENSION: ICD-10-CM

## 2021-12-02 DIAGNOSIS — F41.9 ANXIETY: Primary | ICD-10-CM

## 2021-12-02 DIAGNOSIS — Z82.49 FAMILY HISTORY OF HEART DISEASE: ICD-10-CM

## 2021-12-02 DIAGNOSIS — Z11.59 ENCOUNTER FOR HEPATITIS C SCREENING TEST FOR LOW RISK PATIENT: ICD-10-CM

## 2021-12-02 DIAGNOSIS — R79.89 LOW VITAMIN D LEVEL: ICD-10-CM

## 2021-12-02 PROCEDURE — 99215 OFFICE O/P EST HI 40 MIN: CPT | Performed by: INTERNAL MEDICINE

## 2021-12-02 NOTE — PROGRESS NOTES
Diagnoses and all orders for this visit:    1. Anxiety  Patient appears to be doing well on sertraline 50 mg. His LEANNA score is 2 and last year it was 18. Several target markers have improved. He is also engaged in behavioral changes and exercising more at a CAC. BMI 28  Notes some slight weight gain 10 pounds since going to the gym. Discussed with patient that sometimes cardiovascular exercise stimulates appetite. Encouraged continued cardiovascular but also include weights and low-carb diet. Discussed possibly sertraline may be contributing to this but with low-carb diet and exercise should offset.  -     METABOLIC PANEL, COMPREHENSIVE; Future    2. Low vitamin D level  Compliant with prescription vitamin D but now at multivitamin  If vitamin D still low consider evaluation for celiac    -     VITAMIN D, 25 HYDROXY; Future    3. Encounter for hepatitis C screening test for low risk patient  -     HEPATITIS C AB; Future    4. Family history of heart disease  Father with heart disease/stroke at [de-identified] but smaller TIAs previously      Discussed cardiovascular stratification: Mediterranean diet, possibly doing a heart CT scan, following LDL cholesterol in comparison to his father's and also consider even low-dose statin every other day    Can discuss further at annual  -     LIPID PANEL; Future       Borderline blood pressure  His diastolic usually runs in the 80s or 90  We will continue to monitor. He may benefit from 5 mg Benicar. He will check his blood pressure readings and we can discuss at his annual visit. He will follow up in the spring/early summer. He can do his labs now. 11 mm of any abnormalities. Chief Complaint   Patient presents with    Medication Check       Borderline hypertension  Not checking bp at home  He notes his parents had high blood pressure. His father had a stroke in his [de-identified] but prior TIAs. Mother also had blood pressure but not cholesterol.     Anxiety  Sertraline 50 mg took the edge of, calmer,  Not same level of noise sensitivity, triggered by noise in the past.  More patient with family  Eldest off to college and handled this transition well  He notes he has also been at the gym more doing some cardio work. Sleep 6-7 hours, energy level 6-7 he notes  A 10 pound weight gain over the past 6 months      D deficiency  Compliant with surinder and is taking multivitamin with vitamin Dd   No bruising or bleeding    Past Medical History:   Diagnosis Date    Acute low back pain 2012    aggravated by sitting, inactivity    Hypertension     Pain in joint of right wrist 2012    Sciatica      History reviewed. No pertinent surgical history. Social History     Socioeconomic History    Marital status:    Tobacco Use    Smoking status: Never Smoker    Smokeless tobacco: Never Used   Substance and Sexual Activity    Alcohol use: Yes     Alcohol/week: 4.2 standard drinks     Types: 5 Cans of beer per week     Comment: Occasionally    Drug use: No    Sexual activity: Yes     Partners: Female     Comment:  24 years   Social History Narrative    [employer]    22 years    Manageable stress    Slightly less manageable    Work treadmill             Family History   Problem Relation Age of Onset    Cancer Mother         basal cell on leg    Breast Cancer Mother     Hypertension Father     Stroke Father         [de-identified], tia in 57's    Other Father         spinal stenosis, TIA's    Cancer Sister         bile duct cancer, 28 yo passed    Crohn's Disease Sister     Heart Disease Maternal Grandfather 45    Cancer Maternal Grandmother         bile duct cancer    Cancer Paternal Grandfather         stomach    Heart Attack Paternal Grandfather     Crohn's Disease Paternal Grandmother      Current Outpatient Medications   Medication Sig Dispense Refill    sertraline (ZOLOFT) 50 mg tablet Take 1 Tablet by mouth daily.  30 Tablet 0    ibuprofen 200 mg cap Take 200 mg by mouth as needed.  naproxen sodium (ALEVE) 220 mg cap Take 220 mg by mouth as needed. Indications: not taking       No Known Allergies    Review of Systems - General ROS: negative for - chills or fever  Cardiovascular ROS: no chest pain or dyspnea on exertion  Respiratory ROS: no cough, shortness of breath, or wheezing    Visit Vitals  BP (!) 146/89 (BP 1 Location: Left upper arm, BP Patient Position: Sitting, BP Cuff Size: Adult)   Pulse (!) 59   Temp 97.8 °F (36.6 °C) (Oral)   Resp 14   Ht 5' 10\" (1.778 m)   Wt 197 lb (89.4 kg)   SpO2 100%   BMI 28.27 kg/m²     Constitutional: [x] Appears well-developed and well-nourished [x] No apparent distress      [] Abnormal -     Mental status: [x] Alert and awake  [x] Oriented to person/place/time [x] Able to follow commands    [] Abnormal -     Eyes:   EOM    [x]  Normal    [] Abnormal -   Sclera  [x]  Normal    [] Abnormal -          Discharge [x]  None visible   [] Abnormal -     HENT: [x] Normocephalic, atraumatic  [] Abnormal -   [x] Mouth/Throat: Mucous membranes are moist    External Ears [x] Normal  [] Abnormal -    Neck: [x] No visualized mass [] Abnormal -     Pulmonary/Chest: [x] Respiratory effort normal   [x] No visualized signs of difficulty breathing or respiratory distress        [] Abnormal -      Musculoskeletal:   [x] Normal gait with no signs of ataxia         [x] Normal range of motion of neck        [] Abnormal -     Neurological:        [x] No Facial Asymmetry (Cranial nerve 7 motor function) (limited exam due to video visit)          [x] No gaze palsy        [] Abnormal -          Skin:        [x] No significant exanthematous lesions or discoloration noted on facial skin         [] Abnormal -            Psychiatric:       [x] Normal Affect [] Abnormal -        [x] No Hallucinations      ATTENTION:   This medical record was transcribed using an electronic medical records/speech recognition system.   Although proofread, it may and can contain electronic, spelling and other errors. Corrections may be executed at a later time. Please contact us for any clarifications as needed. On this date 12/02/21  I have spent 40 minutes reviewing previous notes, test results and face to face with the patient discussing the diagnosis and importance of compliance with the treatment plan as well as documenting on the day of the visit.

## 2022-01-05 DIAGNOSIS — E55.9 VITAMIN D DEFICIENCY: ICD-10-CM

## 2022-01-05 RX ORDER — SERTRALINE HYDROCHLORIDE 50 MG/1
TABLET, FILM COATED ORAL
Qty: 30 TABLET | Refills: 0 | Status: SHIPPED | OUTPATIENT
Start: 2022-01-05 | End: 2022-01-05 | Stop reason: SDUPTHER

## 2022-01-05 RX ORDER — SERTRALINE HYDROCHLORIDE 50 MG/1
50 TABLET, FILM COATED ORAL DAILY
Qty: 90 TABLET | Refills: 0 | Status: SHIPPED | OUTPATIENT
Start: 2022-01-05 | End: 2022-04-25 | Stop reason: SDUPTHER

## 2022-04-25 DIAGNOSIS — E55.9 VITAMIN D DEFICIENCY: ICD-10-CM

## 2022-04-25 RX ORDER — SERTRALINE HYDROCHLORIDE 50 MG/1
50 TABLET, FILM COATED ORAL DAILY
Qty: 90 TABLET | Refills: 0 | Status: SHIPPED | OUTPATIENT
Start: 2022-04-25 | End: 2022-07-22

## 2022-07-22 DIAGNOSIS — E55.9 VITAMIN D DEFICIENCY: ICD-10-CM

## 2022-07-22 RX ORDER — SERTRALINE HYDROCHLORIDE 50 MG/1
TABLET, FILM COATED ORAL
Qty: 90 TABLET | Refills: 0 | Status: SHIPPED | OUTPATIENT
Start: 2022-07-22 | End: 2022-10-27 | Stop reason: SDUPTHER

## 2022-10-27 DIAGNOSIS — E55.9 VITAMIN D DEFICIENCY: ICD-10-CM

## 2022-10-27 RX ORDER — SERTRALINE HYDROCHLORIDE 50 MG/1
50 TABLET, FILM COATED ORAL DAILY
Qty: 90 TABLET | Refills: 0 | Status: SHIPPED | OUTPATIENT
Start: 2022-10-27 | End: 2022-11-07 | Stop reason: SDUPTHER

## 2022-11-07 ENCOUNTER — OFFICE VISIT (OUTPATIENT)
Dept: INTERNAL MEDICINE CLINIC | Age: 52
End: 2022-11-07
Payer: COMMERCIAL

## 2022-11-07 VITALS
SYSTOLIC BLOOD PRESSURE: 124 MMHG | HEART RATE: 70 BPM | WEIGHT: 194.8 LBS | OXYGEN SATURATION: 96 % | HEIGHT: 70 IN | BODY MASS INDEX: 27.89 KG/M2 | DIASTOLIC BLOOD PRESSURE: 84 MMHG | TEMPERATURE: 98.1 F | RESPIRATION RATE: 16 BRPM

## 2022-11-07 DIAGNOSIS — Z00.00 WELL ADULT EXAM: Primary | ICD-10-CM

## 2022-11-07 DIAGNOSIS — E55.9 VITAMIN D DEFICIENCY: ICD-10-CM

## 2022-11-07 DIAGNOSIS — F41.9 ANXIETY: ICD-10-CM

## 2022-11-07 PROCEDURE — 90715 TDAP VACCINE 7 YRS/> IM: CPT | Performed by: INTERNAL MEDICINE

## 2022-11-07 PROCEDURE — 99396 PREV VISIT EST AGE 40-64: CPT | Performed by: INTERNAL MEDICINE

## 2022-11-07 PROCEDURE — 90471 IMMUNIZATION ADMIN: CPT | Performed by: INTERNAL MEDICINE

## 2022-11-07 PROCEDURE — 99213 OFFICE O/P EST LOW 20 MIN: CPT | Performed by: INTERNAL MEDICINE

## 2022-11-07 RX ORDER — SERTRALINE HYDROCHLORIDE 50 MG/1
50 TABLET, FILM COATED ORAL DAILY
Qty: 90 TABLET | Refills: 3 | Status: SHIPPED | OUTPATIENT
Start: 2022-11-07

## 2022-11-07 RX ORDER — ZOSTER VACCINE RECOMBINANT, ADJUVANTED 50 MCG/0.5
0.5 KIT INTRAMUSCULAR ONCE
Qty: 0.5 ML | Refills: 1 | Status: SHIPPED | OUTPATIENT
Start: 2022-11-07 | End: 2022-11-07

## 2022-11-07 NOTE — PROGRESS NOTES
Steven Paul is a 46 y.o. male and presents with Physical (Go over health maintenance) and Labs (Already completed)  . Patient presents for his physical.  He reports overall good health. He has been with [employer] for 25 years. Anxiety  He reports doing overall well on the sertraline 50 mg. Feeling more at ease a work  He report his work will be ramping up in the next 5 months  He has been going to the gym: Arm ergometer for 14 minutes, core weight lifting machines, pull up and dips machine 15 each, elliptical machine x 4 times/week-2 hours  His regimen has also helped with his mental health. BMI 27  Despite his exercise his weight is pretty stable. He notes he still eats carbs but overall eats a heart healthy diet. Depression  Patient reports that he had depression in the past.  Of note, his sister  at the age of 29  yodue to bile duct cancer. colitis/crohns. his  maternal grandmother grandparent  of the same thing. Phq 2 is O      Family history of GI cancer  Sister passed at the age of 29 with history of Crohn's disease and colitis. His maternal grandmother also had bile duct cancer      LABS are PENDING    Review of Systems  Constitutional: negative for fevers, chills, anorexia and weight loss  Eyes:   negative for visual disturbance and irritation  ENT:   negative for tinnitus,sore throat,nasal congestion,ear pains. hoarseness  Respiratory:  negative for cough, hemoptysis, dyspnea,wheezing  CV:   negative for chest pain, palpitations, lower extremity edema  GI:   negative for nausea, vomiting, diarrhea, abdominal pain,melena  Endo:               negative for polyuria,polydipsia,polyphagia,heat intolerance  Genitourinary: negative for frequency, dysuria and hematuria  Integument:  negative for rash and pruritus  Hematologic:  negative for easy bruising and gum/nose bleeding  Musculoskel: negative for myalgias, arthralgias, back pain, muscle weakness, joint pain  Neurological:  negative for headaches, dizziness, vertigo, memory problems and gait   Behavl/Psych: negative for feelings of anxiety, depression, mood changes    Past Medical History:   Diagnosis Date    Acute low back pain 2012    aggravated by sitting, inactivity    Hypertension     Pain in joint of right wrist 2012    Sciatica      History reviewed. No pertinent surgical history. 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             Family History   Problem Relation Age of Onset    Cancer Mother         basal cell on leg    Breast Cancer Mother     Hypertension Father     Stroke Father         [de-identified], tia in 63's    Other Father         spinal stenosis, TIA's    Cancer Sister         bile duct cancer, 30 yo passed    Crohn's Disease Sister     Heart Disease Maternal Grandfather 45    Cancer Maternal Grandmother         bile duct cancer    Cancer Paternal Grandfather         stomach    Heart Attack Paternal Grandfather     Crohn's Disease Paternal Grandmother      Current Outpatient Medications   Medication Sig Dispense Refill    sertraline (ZOLOFT) 50 mg tablet Take 1 Tablet by mouth daily. 90 Tablet 0    ibuprofen 200 mg cap Take 200 mg by mouth as needed. naproxen sodium 220 mg cap Take 220 mg by mouth as needed.  Indications: not taking       No Known Allergies    Objective:  Visit Vitals  /84 (BP 1 Location: Left upper arm, BP Patient Position: Sitting, BP Cuff Size: Adult)   Pulse 70   Temp 98.1 °F (36.7 °C) (Oral)   Resp 16   Ht 5' 10\" (1.778 m)   Wt 194 lb 12.8 oz (88.4 kg)   SpO2 96%   BMI 27.95 kg/m²     Physical Exam:   General appearance - alert, well appearing, and in no distress  Mental status - alert, oriented to person, place, and time  EYE-RALPH, EOMI, corneas normal, no foreign bodies, visual acuity normal both eyes, no periorbital cellulitis  ENT-ENT exam normal, no neck nodes or sinus tenderness  Nose - normal and patent, no erythema, discharge or polyps  Mouth - mucous membranes moist, pharynx normal without lesions  Neck - supple, no significant adenopathy   Chest - clear to auscultation, no wheezes, rales or rhonchi, symmetric air entry   Heart - normal rate, regular rhythm, normal S1, S2, no murmurs, rubs, clicks or gallops   Abdomen - soft, nontender, nondistended, no masses or organomegaly  Lymph- no adenopathy palpable  Ext-peripheral pulses normal, no pedal edema, no clubbing or cyanosis  Skin-Warm and dry. no hyperpigmentation, vitiligo, or suspicious lesions  Neuro -alert, oriented, normal speech, no focal findings or movement disorder noted  Neck-normal C-spine, no tenderness, full ROM without pain      Results for orders placed or performed in visit on 20/80/56   METABOLIC PANEL, COMPREHENSIVE   Result Value Ref Range    Glucose 102 (H) 65 - 99 mg/dL    BUN 11 6 - 24 mg/dL    Creatinine 0.92 0.76 - 1.27 mg/dL    GFR est non-AA 97 >59 mL/min/1.73    GFR est  >59 mL/min/1.73    BUN/Creatinine ratio 12 9 - 20    Sodium 140 134 - 144 mmol/L    Potassium 4.4 3.5 - 5.2 mmol/L    Chloride 99 96 - 106 mmol/L    CO2 25 20 - 29 mmol/L    Calcium 9.5 8.7 - 10.2 mg/dL    Protein, total 6.9 6.0 - 8.5 g/dL    Albumin 4.7 4.0 - 5.0 g/dL    GLOBULIN, TOTAL 2.2 1.5 - 4.5 g/dL    A-G Ratio 2.1 1.2 - 2.2    Bilirubin, total 0.6 0.0 - 1.2 mg/dL    Alk.  phosphatase 41 39 - 117 IU/L    AST (SGOT) 30 0 - 40 IU/L    ALT (SGPT) 35 0 - 44 IU/L   CBC W/O DIFF Result Value Ref Range    WBC 5.4 3.4 - 10.8 x10E3/uL    RBC 5.08 4.14 - 5.80 x10E6/uL    HGB 15.6 13.0 - 17.7 g/dL    HCT 45.8 37.5 - 51.0 %    MCV 90 79 - 97 fL    MCH 30.7 26.6 - 33.0 pg    MCHC 34.1 31.5 - 35.7 g/dL    RDW 11.7 11.6 - 15.4 %    PLATELET 854 726 - 711 x10E3/uL   LIPID PANEL   Result Value Ref Range    Cholesterol, total 203 (H) 100 - 199 mg/dL    Triglyceride 107 0 - 149 mg/dL    HDL Cholesterol 57 >39 mg/dL    VLDL, calculated 21 5 - 40 mg/dL    LDL, calculated 125 (H) 0 - 99 mg/dL   TSH 3RD GENERATION   Result Value Ref Range    TSH 1.880 0.450 - 4.500 uIU/mL   VITAMIN D, 25 HYDROXY   Result Value Ref Range    VITAMIN D, 25-HYDROXY 12.7 (L) 30.0 - 100.0 ng/mL   PSA SCREENING (SCREENING)   Result Value Ref Range    Prostate Specific Ag 0.5 0.0 - 4.0 ng/mL   VITAMIN B12   Result Value Ref Range    Vitamin B12 394 232 - 1,245 pg/mL   CVD REPORT   Result Value Ref Range    INTERPRETATION Note      Prevention    Cardiovascular profile  Family hx  Exercising: Patient has not been exercising due to his flare of sciatica. He is being seen by physical therapy at [facility]. Blood pressure:  Health healthy diet:  Diabetes:  Cholesterol:  Renal function:      Cancer risk profile  Patient has a significant family history for cancer. PSA he denies urinary problems  Lung  Colonoscopy will send patient for colonoscopy. He may also need endoscopy and possibly antibody testing with his family history of bile duct carcinoma  Skin nonhealing in 2 weeks patient is due to be seen by dermatology. We will send him for screening        Thyroid sx  Patient does have some anxiety LEANNA-7 score revealing moderate    Osteopenia prevention  Calcium 1000mg/day yes  Vitamin D 800iu/day yes    Mental health scale:  As above  Depression  Anxiety  Sleep # of hours:  Energy Level:        Immunizations  Patient is due for his Tdap vaccine if he has not had yet.   TDAP  Pneumonia vaccine  Flu vaccine  Shingles vaccine  HPV      Diagnoses and all orders for this visit:    1. Well adult exam  Patient reports overall very good health. Labs are pending. He did these this morning.  -     REFERRAL TO GASTROENTEROLOGY  -     REFERRAL TO DERMATOLOGY  -     varicella-zoster recombinant, PF, (Shingrix, PF,) 50 mcg/0.5 mL susr injection; 0.5 mL by IntraMUSCular route once for 1 dose. -     TDAP, BOOSTRIX, (AGE 10 YRS+), IM    2. Vitamin D deficiency  -     sertraline (ZOLOFT) 50 mg tablet; Take 1 Tablet by mouth daily. 3. Anxiety  Seems to be optimally controlled however monitoring  He anticipates more work stress in the next 5 months  Discussed with patient and will stay at 50 mg along with his exercise program however low threshold to increase to 75 mg if needed and can slide back to 50 mg after the 5 months or over  -     sertraline (ZOLOFT) 50 mg tablet; Take 1 Tablet by mouth daily. This medical record was transcribed using an electronic medical records/speech recognition system. Although proofread, it may and can contain electronic, spelling and other errors. Corrections may be executed at a later time. Please contact us for any clarifications as needed. On this date 11/07/22  I have spent 20  minutes reviewing previous notes, test results and face to face with the patient discussing the diagnosis and importance of compliance with the treatment plan as well as documenting on the day of the visit.

## 2023-08-21 ENCOUNTER — OFFICE VISIT (OUTPATIENT)
Age: 53
End: 2023-08-21
Payer: COMMERCIAL

## 2023-08-21 VITALS
BODY MASS INDEX: 27.94 KG/M2 | SYSTOLIC BLOOD PRESSURE: 120 MMHG | TEMPERATURE: 97.8 F | DIASTOLIC BLOOD PRESSURE: 82 MMHG | HEART RATE: 75 BPM | OXYGEN SATURATION: 98 % | RESPIRATION RATE: 16 BRPM | HEIGHT: 70 IN | WEIGHT: 195.2 LBS

## 2023-08-21 DIAGNOSIS — M54.32 SCIATICA OF LEFT SIDE: Primary | ICD-10-CM

## 2023-08-21 PROBLEM — F32.A ANXIETY AND DEPRESSION: Status: ACTIVE | Noted: 2023-08-21

## 2023-08-21 PROBLEM — F41.9 ANXIETY AND DEPRESSION: Status: ACTIVE | Noted: 2023-08-21

## 2023-08-21 PROCEDURE — 99213 OFFICE O/P EST LOW 20 MIN: CPT | Performed by: NURSE PRACTITIONER

## 2023-08-21 RX ORDER — PREDNISONE 20 MG/1
TABLET ORAL
Qty: 18 TABLET | Refills: 0 | Status: SHIPPED | OUTPATIENT
Start: 2023-08-21

## 2023-08-21 RX ORDER — CYCLOBENZAPRINE HCL 10 MG
10 TABLET ORAL 2 TIMES DAILY PRN
Qty: 20 TABLET | Refills: 0 | Status: SHIPPED | OUTPATIENT
Start: 2023-08-21 | End: 2023-08-31

## 2023-08-21 SDOH — ECONOMIC STABILITY: FOOD INSECURITY: WITHIN THE PAST 12 MONTHS, YOU WORRIED THAT YOUR FOOD WOULD RUN OUT BEFORE YOU GOT MONEY TO BUY MORE.: NEVER TRUE

## 2023-08-21 SDOH — ECONOMIC STABILITY: FOOD INSECURITY: WITHIN THE PAST 12 MONTHS, THE FOOD YOU BOUGHT JUST DIDN'T LAST AND YOU DIDN'T HAVE MONEY TO GET MORE.: NEVER TRUE

## 2023-08-21 SDOH — ECONOMIC STABILITY: HOUSING INSECURITY
IN THE LAST 12 MONTHS, WAS THERE A TIME WHEN YOU DID NOT HAVE A STEADY PLACE TO SLEEP OR SLEPT IN A SHELTER (INCLUDING NOW)?: NO

## 2023-08-21 SDOH — ECONOMIC STABILITY: INCOME INSECURITY: HOW HARD IS IT FOR YOU TO PAY FOR THE VERY BASICS LIKE FOOD, HOUSING, MEDICAL CARE, AND HEATING?: NOT HARD AT ALL

## 2023-08-21 ASSESSMENT — ENCOUNTER SYMPTOMS
NAUSEA: 0
BACK PAIN: 1
COUGH: 0
VOMITING: 0
RHINORRHEA: 0
EYE PAIN: 0
CONSTIPATION: 0
EYE REDNESS: 0
SHORTNESS OF BREATH: 0
ABDOMINAL PAIN: 0
SINUS PRESSURE: 0
DIARRHEA: 0
RESPIRATORY NEGATIVE: 1
GASTROINTESTINAL NEGATIVE: 1
SINUS PAIN: 0
EYES NEGATIVE: 1
CHEST TIGHTNESS: 0
BLOOD IN STOOL: 0

## 2023-08-21 ASSESSMENT — PATIENT HEALTH QUESTIONNAIRE - PHQ9
SUM OF ALL RESPONSES TO PHQ QUESTIONS 1-9: 0
2. FEELING DOWN, DEPRESSED OR HOPELESS: 0
SUM OF ALL RESPONSES TO PHQ9 QUESTIONS 1 & 2: 0
SUM OF ALL RESPONSES TO PHQ QUESTIONS 1-9: 0
1. LITTLE INTEREST OR PLEASURE IN DOING THINGS: 0
SUM OF ALL RESPONSES TO PHQ QUESTIONS 1-9: 0
SUM OF ALL RESPONSES TO PHQ QUESTIONS 1-9: 0

## 2023-08-21 NOTE — PROGRESS NOTES
Assessment and Plan     1. Sciatica of left side: Will start treatment with Flexeril and prednisone, mode of use and possible side effects discussed. Will refer to PT. Warm compresses, stretching exercises discussed. Return instructions given. Pt verbalized understanding.   -     cyclobenzaprine (FLEXERIL) 10 MG tablet; Take 1 tablet by mouth 2 times daily as needed for Muscle spasms, Disp-20 tablet, R-0Normal  -     predniSONE (DELTASONE) 20 MG tablet; Take 3 pills (60 mg) daily for 3 days, then 2 pills (40 mg) daily for 3 days, then 1 pill (20 mg) daily for 3 days, Disp-18 tablet, R-0Normal  -     [facility] - Physical Therapy at [facility]       Benefits, risks, possible drug interactions, and side effects of all new medications were reviewed with the patient. Pt verbalized understanding. An electronic signature was used to authenticate this note. KARLEE Christensen - CNP  8/21/2023    Follow-up and Dispositions    Return if symptoms worsen or fail to improve. History of Present Illness   Chief Complaint     Robe Anthony is a 48 y.o. male here for had concerns including Back Pain (Patient reports pain in the left hamstring radiating down to the calf for 2 week. He admits Hx of sciatic about 2 years ago. Pain seems to exacerbate after been resting for a while and trying to walk. Today pain level 6/10). Pt presents today with reports of L sided lower back pain radiating to L lower extremity, onset 2 weeks ago. Pain is described as dull and sharp as it goes down the leg, aggravated with movement. Walking has helped alleviate pain. Has taken Aleve and doing swimming and at home exercises has assisted with pain, however pain still returns daily. Pain is particularly more pronounce in the morning when waking up. Denies recent injuries or trauma. Denies changes in urination or BM. Review of Systems  Review of Systems   Constitutional: Negative.   Negative for chills, fatigue, fever

## 2023-09-22 ENCOUNTER — HOSPITAL ENCOUNTER (OUTPATIENT)
Facility: HOSPITAL | Age: 53
Setting detail: RECURRING SERIES
Discharge: HOME | End: 2023-09-25
Payer: COMMERCIAL

## 2023-09-22 PROCEDURE — 97162 PT EVAL MOD COMPLEX 30 MIN: CPT

## 2023-09-22 PROCEDURE — 97110 THERAPEUTIC EXERCISES: CPT

## 2023-09-22 NOTE — THERAPY EVALUATION
Physical Therapy at [facility],   a part of [address] [place] [address]  Phone: [phone]  Fax: [phone]     PHYSICAL THERAPY - MEDICARE EVALUATION/PLAN OF CARE NOTE (updated 3/23)    Date: 2023        Patient Name:  Little Marroquin :  1970   Medical   Diagnosis:  Sciatica of left side [M54.32] Treatment Diagnosis:  M54.42  LUMBAGO WITH SCIATICA, LEFT SIDE    Referral Source:  Annabella Colon* Provider #:  [id number]                Insurance: Payor: Winston Justine / Plan: [facility] / Product Type: *No Product type* /      Patient  verified yes     Visit #   Current  / Total 1 Med nec   Time   In / Out 920a 1015a   Total Treatment Time 55   Total Timed Codes 25   1:1 Treatment Time 25    [facility] Totals Reminder:  bill using total billable   min of TIMED therapeutic procedures and modalities. 8-22 min = 1 unit; 23-37 min = 2 units; 38-52 min = 3 units;  53-67 min = 4 units; 68-82 min = 5 units     SUBJECTIVE  Pain Level (0-10 scale): 0 current, 7/10 worst  []constant []intermittent []improving []worsening []no change since onset    Any medication changes, allergies to medications, adverse drug reactions, diagnosis change, or new procedure performed?: [x] No    [] Yes (see summary sheet for update)  Medications: Verified on Patient Summary List    Subjective functional status/changes:     Better now but 2 bouts of shooting pain into L leg and heel earlier this year. Most recent episode started mid to late august and was more intense than earlier in the year. Was doing a lot of bending at that time. Ms relaxers and prednisolone helped with swimming this time. Now has an \"echo\" of nerve pain on left leg and small numb spot on L outer foot. Prolonged standing and sitting were uncomfortable felt really stiff, and worse in the morning. Denies recent changes to bowel or bladder function.      Previous piriformis

## 2023-09-29 ENCOUNTER — APPOINTMENT (OUTPATIENT)
Facility: HOSPITAL | Age: 53
End: 2023-09-29
Payer: COMMERCIAL

## 2023-10-04 ENCOUNTER — HOSPITAL ENCOUNTER (OUTPATIENT)
Facility: HOSPITAL | Age: 53
Setting detail: RECURRING SERIES
Discharge: HOME | End: 2023-10-07
Payer: COMMERCIAL

## 2023-10-04 PROCEDURE — 97110 THERAPEUTIC EXERCISES: CPT

## 2023-10-04 PROCEDURE — 97140 MANUAL THERAPY 1/> REGIONS: CPT

## 2023-10-04 PROCEDURE — 97112 NEUROMUSCULAR REEDUCATION: CPT

## 2023-10-04 NOTE — THERAPY EVALUATION
greater to improve ease in reaching items off of a low shelf in home setting     Long Term Goals: To be accomplished in 10 weeks. Patient will report worst pain no greater than 2/10 to increase QOL and allow for independence with all hygienic self-care and ADL skills   Patient will demonstrate 5/5 BLE strength to allow for home cleaning skills independently and without rest breaks needed  Patient will be able to tolerate 30 minutes of standing without symptoms to allow for meal preparation without limitation.        PLAN  Yes  Continue plan of care  Re-Cert Due: 90 days  [x]  Upgrade activities as tolerated  []  Discharge due to :  []  Other:      Alana Miranda, PT  DPT    10/4/2023       5:48 PM

## 2023-10-13 ENCOUNTER — HOSPITAL ENCOUNTER (OUTPATIENT)
Facility: HOSPITAL | Age: 53
Setting detail: RECURRING SERIES
Discharge: HOME | End: 2023-10-16
Payer: COMMERCIAL

## 2023-10-13 PROCEDURE — 97110 THERAPEUTIC EXERCISES: CPT

## 2023-10-13 PROCEDURE — 97112 NEUROMUSCULAR REEDUCATION: CPT

## 2023-10-13 PROCEDURE — 97140 MANUAL THERAPY 1/> REGIONS: CPT

## 2023-10-13 NOTE — THERAPY EVALUATION
PHYSICAL THERAPY - MEDICARE DAILY TREATMENT NOTE (updated 3/23)      Date: 10/13/2023          Patient Name:  Jenny Walsh :  1970   Medical   Diagnosis:  Sciatica of left side [M54.32] Treatment Diagnosis:  M54.42  LUMBAGO WITH SCIATICA, LEFT SIDE    Referral Source:  Annabella Torres* Insurance:   Payor: Yanni Barrios / Plan: [facility] Acevedo / Product Type: *No Product type* /                     Patient  verified yes     Visit #   Current  / Total 3 Max allowed   Time   In / Out 10:00a 11:00a   Total Treatment Time 60   Total Timed Codes 60   1:1 Treatment Time 60      [facility] Totals Reminder:  bill using total billable   min of TIMED therapeutic procedures and modalities. 8-22 min = 1 unit; 23-37 min = 2 units; 38-52 min = 3 units; 53-67 min = 4 units; 68-82 min = 5 units            SUBJECTIVE    Pain Level (0-10 scale): 2    Any medication changes, allergies to medications, adverse drug reactions, diagnosis change, or new procedure performed?: [x] No    [] Yes (see summary sheet for update)  Medications: Verified on Patient Summary List    Subjective functional status/changes:     Patient reporting some stiffness in the lower back and reporting some cramping in the calf    OBJECTIVE    Therapeutic Procedures: Tx Min Billable or 1:1 Min (if diff from Tx Min) Procedure, Rationale, Specifics   15  63563 Manual Therapy (timed):  decrease pain, increase ROM, increase tissue extensibility, decrease trigger points, and increase postural awareness to improve patient's ability to progress to PLOF and address remaining functional goals. The manual therapy interventions were performed at a separate and distinct time from the therapeutic activities interventions .  (see flow sheet as applicable)     Details if applicable:  STM, DTM BL QL, lumbar paraspinals, L sided L3-4 flexion gapping with cavitation grade 3-4   30  94395 Neuromuscular Re-Education (timed):  improve balance, coordination, kinesthetic

## 2023-10-19 NOTE — PROGRESS NOTES
PHYSICAL THERAPY - MEDICARE DAILY TREATMENT NOTE (updated 3/23)      Date: 10/19/2023          Patient Name:  Carlos Recinos :  1970   Medical   Diagnosis:  Sciatica of left side [M54.32] Treatment Diagnosis:  M54.42  LUMBAGO WITH SCIATICA, LEFT SIDE    Referral Source:  Zoila Arceo, Annabella* Insurance:   Payor: [employer] / Plan: Sharri Antes / Product Type: *No Product type* /                     Patient  verified yes     Visit #   Current  / Total 3 Max allowed   Time   In / Out 10:00a 11:00a   Total Treatment Time 60   Total Timed Codes 60   1:1 Treatment Time 60      [facility] Totals Reminder:  bill using total billable   min of TIMED therapeutic procedures and modalities. 8-22 min = 1 unit; 23-37 min = 2 units; 38-52 min = 3 units; 53-67 min = 4 units; 68-82 min = 5 units            SUBJECTIVE    Pain Level (0-10 scale): 2    Any medication changes, allergies to medications, adverse drug reactions, diagnosis change, or new procedure performed?: [x] No    [] Yes (see summary sheet for update)  Medications: Verified on Patient Summary List    Subjective functional status/changes:     Patient reporting some stiffness in the lower back and reporting some cramping in the calf    OBJECTIVE    Therapeutic Procedures: Tx Min Billable or 1:1 Min (if diff from Tx Min) Procedure, Rationale, Specifics   15  98742 Manual Therapy (timed):  decrease pain, increase ROM, increase tissue extensibility, decrease trigger points, and increase postural awareness to improve patient's ability to progress to PLOF and address remaining functional goals. The manual therapy interventions were performed at a separate and distinct time from the therapeutic activities interventions .  (see flow sheet as applicable)     Details if applicable:  STM, DTM BL QL, lumbar paraspinals, L sided L3-4 flexion gapping with cavitation grade 3-4   30  89617 Neuromuscular Re-Education (timed):  improve balance, coordination, kinesthetic

## 2023-10-20 ENCOUNTER — APPOINTMENT (OUTPATIENT)
Facility: HOSPITAL | Age: 53
End: 2023-10-20
Payer: COMMERCIAL

## 2023-10-27 ENCOUNTER — APPOINTMENT (OUTPATIENT)
Facility: HOSPITAL | Age: 53
End: 2023-10-27
Payer: COMMERCIAL

## 2023-12-08 NOTE — TELEPHONE ENCOUNTER
The patient is requesting a call back to discuss his refill request for sertraline (ZOLOFT) 50 MG tablet.  He stated that the pharmacy do not have it and he will like to know what going on. [address] [place] [address] [phone] - F [phone]

## 2023-12-08 NOTE — TELEPHONE ENCOUNTER
I spoke with pts pharmacy and provided a verbal order of the script that was sent in on 12/6/23. I spoke with pt to advise rx is at the pharmacy, I provided the pharmacist with a verbal order. Pt scheduled an appt with pcp in 01/2024, pt was thankful.

## 2024-02-13 ENCOUNTER — OFFICE VISIT (OUTPATIENT)
Age: 54
End: 2024-02-13
Payer: COMMERCIAL

## 2024-02-13 VITALS
HEIGHT: 70 IN | HEART RATE: 65 BPM | OXYGEN SATURATION: 97 % | BODY MASS INDEX: 28.63 KG/M2 | WEIGHT: 200 LBS | RESPIRATION RATE: 14 BRPM | DIASTOLIC BLOOD PRESSURE: 84 MMHG | TEMPERATURE: 97.7 F | SYSTOLIC BLOOD PRESSURE: 131 MMHG

## 2024-02-13 DIAGNOSIS — F41.1 GENERALIZED ANXIETY DISORDER: ICD-10-CM

## 2024-02-13 DIAGNOSIS — R03.0 ELEVATED BLOOD PRESSURE READING IN OFFICE WITH WHITE COAT SYNDROME, WITHOUT DIAGNOSIS OF HYPERTENSION: ICD-10-CM

## 2024-02-13 DIAGNOSIS — Z00.00 WELL ADULT EXAM: Primary | ICD-10-CM

## 2024-02-13 PROCEDURE — 99214 OFFICE O/P EST MOD 30 MIN: CPT | Performed by: INTERNAL MEDICINE

## 2024-02-13 PROCEDURE — 99396 PREV VISIT EST AGE 40-64: CPT | Performed by: INTERNAL MEDICINE

## 2024-02-13 RX ORDER — NAPROXEN 250 MG/1
250 TABLET ORAL 2 TIMES DAILY WITH MEALS
COMMUNITY

## 2024-02-13 NOTE — PROGRESS NOTES
Bilirubin, total 0.6 0.0 - 1.2 mg/dL    Alk. phosphatase 41 39 - 117 IU/L    AST (SGOT) 30 0 - 40 IU/L    ALT (SGPT) 35 0 - 44 IU/L   CBC W/O DIFF   Result Value Ref Range    WBC 5.4 3.4 - 10.8 x10E3/uL    RBC 5.08 4.14 - 5.80 x10E6/uL    HGB 15.6 13.0 - 17.7 g/dL    HCT 45.8 37.5 - 51.0 %    MCV 90 79 - 97 fL    MCH 30.7 26.6 - 33.0 pg    MCHC 34.1 31.5 - 35.7 g/dL    RDW 11.7 11.6 - 15.4 %    PLATELET 310 150 - 450 x10E3/uL   LIPID PANEL   Result Value Ref Range    Cholesterol, total 203 (H) 100 - 199 mg/dL    Triglyceride 107 0 - 149 mg/dL    HDL Cholesterol 57 >39 mg/dL    VLDL, calculated 21 5 - 40 mg/dL    LDL, calculated 125 (H) 0 - 99 mg/dL   TSH 3RD GENERATION   Result Value Ref Range    TSH 1.880 0.450 - 4.500 uIU/mL   VITAMIN D, 25 HYDROXY   Result Value Ref Range    VITAMIN D, 25-HYDROXY 12.7 (L) 30.0 - 100.0 ng/mL   PSA SCREENING (SCREENING)   Result Value Ref Range    Prostate Specific Ag 0.5 0.0 - 4.0 ng/mL   VITAMIN B12   Result Value Ref Range    Vitamin B12 394 232 - 1,245 pg/mL   CVD REPORT   Result Value Ref Range    INTERPRETATION Note      Prevention    Cardiovascular profile  Family hx  Exercising: Patient has not been exercising due to his flare of sciatica.  He is being seen by physical therapy at [facility].  Blood pressure:  Health healthy diet:  Diabetes:  Cholesterol:  Renal function:      Cancer risk profile  Patient has a significant family history for cancer.    PSA he denies urinary problems  Lung  Colonoscopy will send patient for colonoscopy.  He may also need endoscopy and possibly antibody testing with his family history of bile duct carcinoma  Skin nonhealing in 2 weeks patient is due to be seen by dermatology.  We will send him for screening        Thyroid sx  Patient does have some anxiety CHELSY-7 score revealing moderate    Osteopenia prevention  Calcium 1000mg/day yes  Vitamin D 800iu/day yes    Mental health scale:  As above  Depression  Anxiety  Sleep # of hours:  Energy

## 2024-03-01 ENCOUNTER — TRANSCRIBE ORDERS (OUTPATIENT)
Facility: HOSPITAL | Age: 54
End: 2024-03-01

## 2024-03-01 ENCOUNTER — HOSPITAL ENCOUNTER (OUTPATIENT)
Facility: HOSPITAL | Age: 54
Discharge: HOME | End: 2024-03-01
Payer: COMMERCIAL

## 2024-03-01 DIAGNOSIS — G89.29 CHRONIC LEFT-SIDED LOW BACK PAIN WITH LEFT-SIDED SCIATICA: ICD-10-CM

## 2024-03-01 DIAGNOSIS — M54.42 CHRONIC LEFT-SIDED LOW BACK PAIN WITH LEFT-SIDED SCIATICA: Primary | ICD-10-CM

## 2024-03-01 DIAGNOSIS — M54.42 CHRONIC LEFT-SIDED LOW BACK PAIN WITH LEFT-SIDED SCIATICA: ICD-10-CM

## 2024-03-01 DIAGNOSIS — G89.29 CHRONIC LEFT-SIDED LOW BACK PAIN WITH LEFT-SIDED SCIATICA: Primary | ICD-10-CM

## 2024-03-01 PROCEDURE — 72110 X-RAY EXAM L-2 SPINE 4/>VWS: CPT

## 2025-02-25 DIAGNOSIS — F41.1 GENERALIZED ANXIETY DISORDER: ICD-10-CM

## 2025-08-14 ENCOUNTER — OFFICE VISIT (OUTPATIENT)
Facility: CLINIC | Age: 55
End: 2025-08-14
Payer: COMMERCIAL

## 2025-08-14 VITALS
TEMPERATURE: 98.4 F | RESPIRATION RATE: 16 BRPM | OXYGEN SATURATION: 98 % | WEIGHT: 205.6 LBS | DIASTOLIC BLOOD PRESSURE: 88 MMHG | HEIGHT: 70 IN | SYSTOLIC BLOOD PRESSURE: 138 MMHG | HEART RATE: 85 BPM | BODY MASS INDEX: 29.43 KG/M2

## 2025-08-14 DIAGNOSIS — Z00.00 WELL ADULT EXAM: Primary | ICD-10-CM

## 2025-08-14 DIAGNOSIS — R03.0 BORDERLINE BLOOD PRESSURE: ICD-10-CM

## 2025-08-14 DIAGNOSIS — F41.1 GENERALIZED ANXIETY DISORDER: ICD-10-CM

## 2025-08-14 PROCEDURE — 99396 PREV VISIT EST AGE 40-64: CPT | Performed by: INTERNAL MEDICINE

## 2025-08-14 PROCEDURE — 99214 OFFICE O/P EST MOD 30 MIN: CPT | Performed by: INTERNAL MEDICINE

## 2025-08-14 RX ORDER — AMLODIPINE BESYLATE 5 MG/1
5 TABLET ORAL DAILY
Qty: 30 TABLET | Refills: 1 | Status: SHIPPED | OUTPATIENT
Start: 2025-08-14

## 2025-08-14 ASSESSMENT — PATIENT HEALTH QUESTIONNAIRE - PHQ9
8. MOVING OR SPEAKING SO SLOWLY THAT OTHER PEOPLE COULD HAVE NOTICED. OR THE OPPOSITE, BEING SO FIGETY OR RESTLESS THAT YOU HAVE BEEN MOVING AROUND A LOT MORE THAN USUAL: NOT AT ALL
1. LITTLE INTEREST OR PLEASURE IN DOING THINGS: NOT AT ALL
10. IF YOU CHECKED OFF ANY PROBLEMS, HOW DIFFICULT HAVE THESE PROBLEMS MADE IT FOR YOU TO DO YOUR WORK, TAKE CARE OF THINGS AT HOME, OR GET ALONG WITH OTHER PEOPLE: NOT DIFFICULT AT ALL
9. THOUGHTS THAT YOU WOULD BE BETTER OFF DEAD, OR OF HURTING YOURSELF: NOT AT ALL
SUM OF ALL RESPONSES TO PHQ QUESTIONS 1-9: 0
SUM OF ALL RESPONSES TO PHQ QUESTIONS 1-9: 0
4. FEELING TIRED OR HAVING LITTLE ENERGY: NOT AT ALL
2. FEELING DOWN, DEPRESSED OR HOPELESS: NOT AT ALL
7. TROUBLE CONCENTRATING ON THINGS, SUCH AS READING THE NEWSPAPER OR WATCHING TELEVISION: NOT AT ALL
SUM OF ALL RESPONSES TO PHQ QUESTIONS 1-9: 0
5. POOR APPETITE OR OVEREATING: NOT AT ALL
3. TROUBLE FALLING OR STAYING ASLEEP: NOT AT ALL
SUM OF ALL RESPONSES TO PHQ QUESTIONS 1-9: 0
6. FEELING BAD ABOUT YOURSELF - OR THAT YOU ARE A FAILURE OR HAVE LET YOURSELF OR YOUR FAMILY DOWN: NOT AT ALL

## 2025-08-28 DIAGNOSIS — Z00.00 WELL ADULT EXAM: ICD-10-CM

## 2025-08-28 LAB
ALBUMIN SERPL-MCNC: 4.5 G/DL (ref 3.5–5.2)
ALBUMIN/GLOB SERPL: 1.8 (ref 1.1–2.2)
ALP SERPL-CCNC: 42 U/L (ref 40–129)
ALT SERPL-CCNC: 38 U/L (ref 10–50)
ANION GAP SERPL CALC-SCNC: 10 MMOL/L (ref 2–14)
AST SERPL-CCNC: 31 U/L (ref 10–50)
BILIRUB SERPL-MCNC: 0.5 MG/DL (ref 0–1.2)
BUN SERPL-MCNC: 14 MG/DL (ref 6–20)
BUN/CREAT SERPL: 15 (ref 12–20)
CALCIUM SERPL-MCNC: 9.4 MG/DL (ref 8.6–10)
CHLORIDE SERPL-SCNC: 103 MMOL/L (ref 98–107)
CHOLEST SERPL-MCNC: 255 MG/DL (ref 0–200)
CO2 SERPL-SCNC: 27 MMOL/L (ref 20–29)
CREAT SERPL-MCNC: 0.97 MG/DL (ref 0.7–1.2)
EST. AVERAGE GLUCOSE BLD GHB EST-MCNC: 114 MG/DL
GLOBULIN SER CALC-MCNC: 2.5 G/DL (ref 2–4)
GLUCOSE SERPL-MCNC: 108 MG/DL (ref 65–100)
HBA1C MFR BLD: 5.6 % (ref 4–5.6)
HDLC SERPL-MCNC: 59 MG/DL (ref 40–60)
HDLC SERPL: 4.3 (ref 0–5)
LDLC SERPL CALC-MCNC: 169 MG/DL (ref 0–100)
POTASSIUM SERPL-SCNC: 4.5 MMOL/L (ref 3.5–5.1)
PROT SERPL-MCNC: 7 G/DL (ref 6.4–8.3)
PSA SERPL-MCNC: 0.4 NG/ML (ref 0–3.1)
SODIUM SERPL-SCNC: 140 MMOL/L (ref 136–145)
TRIGL SERPL-MCNC: 134 MG/DL (ref 0–150)
TSH, 3RD GENERATION: 1.74 UIU/ML (ref 0.27–4.2)
VLDLC SERPL CALC-MCNC: 27 MG/DL

## 2025-08-29 ENCOUNTER — RESULTS FOLLOW-UP (OUTPATIENT)
Facility: CLINIC | Age: 55
End: 2025-08-29